# Patient Record
Sex: FEMALE | Race: WHITE | NOT HISPANIC OR LATINO | Employment: OTHER | URBAN - NONMETROPOLITAN AREA
[De-identification: names, ages, dates, MRNs, and addresses within clinical notes are randomized per-mention and may not be internally consistent; named-entity substitution may affect disease eponyms.]

---

## 2023-05-11 PROBLEM — E87.6 HYPOKALEMIA: Status: ACTIVE | Noted: 2023-05-11

## 2023-05-16 DIAGNOSIS — E78.00 HYPERCHOLESTEROLEMIA: ICD-10-CM

## 2023-05-16 DIAGNOSIS — I10 BENIGN HYPERTENSION: ICD-10-CM

## 2023-05-17 RX ORDER — HYDROGEN PEROXIDE 3 %
1 SOLUTION, NON-ORAL MISCELLANEOUS DAILY
COMMUNITY
Start: 2015-12-09 | End: 2023-12-19 | Stop reason: WASHOUT

## 2023-05-17 RX ORDER — VERAPAMIL HYDROCHLORIDE 240 MG/1
TABLET, FILM COATED, EXTENDED RELEASE ORAL
Qty: 90 TABLET | Refills: 0 | Status: SHIPPED | OUTPATIENT
Start: 2023-05-17 | End: 2023-06-15 | Stop reason: SDUPTHER

## 2023-05-17 RX ORDER — OLMESARTAN MEDOXOMIL AND HYDROCHLOROTHIAZIDE 40/25 40; 25 MG/1; MG/1
1 TABLET ORAL DAILY
COMMUNITY
Start: 2021-04-22 | End: 2023-12-19 | Stop reason: WASHOUT

## 2023-05-17 RX ORDER — OLMESARTAN MEDOXOMIL AND HYDROCHLOROTHIAZIDE 40/25 40; 25 MG/1; MG/1
TABLET ORAL
Qty: 90 TABLET | Refills: 0 | Status: SHIPPED | OUTPATIENT
Start: 2023-05-17 | End: 2023-06-15 | Stop reason: SDUPTHER

## 2023-05-17 RX ORDER — VERAPAMIL HYDROCHLORIDE 240 MG/1
1 TABLET, FILM COATED, EXTENDED RELEASE ORAL DAILY
COMMUNITY
Start: 2013-08-28 | End: 2023-11-15 | Stop reason: SDUPTHER

## 2023-05-17 RX ORDER — HYDROGEN PEROXIDE 3 %
SOLUTION, NON-ORAL MISCELLANEOUS
Qty: 90 CAPSULE | Refills: 0 | Status: SHIPPED | OUTPATIENT
Start: 2023-05-17 | End: 2023-06-15 | Stop reason: SDUPTHER

## 2023-05-17 RX ORDER — SIMVASTATIN 20 MG/1
TABLET, FILM COATED ORAL
Qty: 90 TABLET | Refills: 0 | Status: SHIPPED | OUTPATIENT
Start: 2023-05-17 | End: 2023-06-15 | Stop reason: SDUPTHER

## 2023-05-17 RX ORDER — SIMVASTATIN 20 MG/1
1 TABLET, FILM COATED ORAL DAILY
COMMUNITY
Start: 2013-05-27 | End: 2023-11-15 | Stop reason: SDUPTHER

## 2023-06-07 PROBLEM — E78.5 HYPERLIPIDEMIA: Status: ACTIVE | Noted: 2023-06-07

## 2023-06-07 PROBLEM — J45.909 ASTHMATIC BRONCHITIS (HHS-HCC): Status: ACTIVE | Noted: 2023-06-07

## 2023-06-07 PROBLEM — I10 BENIGN ESSENTIAL HYPERTENSION: Status: ACTIVE | Noted: 2023-06-07

## 2023-06-07 PROBLEM — K58.9 IBS (IRRITABLE BOWEL SYNDROME): Status: ACTIVE | Noted: 2023-06-07

## 2023-06-07 PROBLEM — K21.9 ESOPHAGEAL REFLUX: Status: ACTIVE | Noted: 2023-06-07

## 2023-06-07 PROBLEM — K76.0 NONALCOHOLIC HEPATOSTEATOSIS: Status: ACTIVE | Noted: 2023-06-07

## 2023-06-07 PROBLEM — E66.9 NON MORBID OBESITY, UNSPECIFIED OBESITY TYPE: Status: ACTIVE | Noted: 2023-06-07

## 2023-06-07 PROBLEM — F32.5 DEPRESSION, MAJOR, IN REMISSION (CMS-HCC): Status: ACTIVE | Noted: 2023-06-07

## 2023-06-07 PROBLEM — I10 ACCELERATED HYPERTENSION: Status: ACTIVE | Noted: 2023-06-07

## 2023-06-07 PROBLEM — K64.3 GRADE IV HEMORRHOIDS: Status: ACTIVE | Noted: 2023-06-07

## 2023-06-07 PROBLEM — J30.9 ALLERGIC RHINITIS: Status: ACTIVE | Noted: 2023-06-07

## 2023-06-07 PROBLEM — D32.9 MENINGIOMA (MULTI): Status: ACTIVE | Noted: 2023-06-07

## 2023-06-07 RX ORDER — ALBUTEROL SULFATE 90 UG/1
2 AEROSOL, METERED RESPIRATORY (INHALATION) EVERY 6 HOURS PRN
COMMUNITY
Start: 2017-01-24 | End: 2023-06-15 | Stop reason: SDUPTHER

## 2023-06-07 RX ORDER — CHOLECALCIFEROL (VITAMIN D3) 25 MCG
25 TABLET ORAL DAILY
COMMUNITY
Start: 2022-05-17

## 2023-06-07 RX ORDER — GLUCOSAMINE/CHONDRO SU A 500-400 MG
1 TABLET ORAL 2 TIMES DAILY
COMMUNITY

## 2023-06-07 RX ORDER — ASCORBIC ACID 500 MG
1 TABLET ORAL DAILY
COMMUNITY

## 2023-06-15 ENCOUNTER — OFFICE VISIT (OUTPATIENT)
Dept: PRIMARY CARE | Facility: CLINIC | Age: 68
End: 2023-06-15
Payer: COMMERCIAL

## 2023-06-15 VITALS
WEIGHT: 198.6 LBS | HEIGHT: 61 IN | HEART RATE: 97 BPM | BODY MASS INDEX: 37.49 KG/M2 | TEMPERATURE: 98.6 F | DIASTOLIC BLOOD PRESSURE: 80 MMHG | SYSTOLIC BLOOD PRESSURE: 126 MMHG | OXYGEN SATURATION: 97 %

## 2023-06-15 DIAGNOSIS — E78.00 HYPERCHOLESTEROLEMIA: ICD-10-CM

## 2023-06-15 DIAGNOSIS — Z78.0 MENOPAUSE: ICD-10-CM

## 2023-06-15 DIAGNOSIS — Z00.00 ANNUAL PHYSICAL EXAM: Primary | ICD-10-CM

## 2023-06-15 DIAGNOSIS — I10 BENIGN HYPERTENSION: ICD-10-CM

## 2023-06-15 DIAGNOSIS — K21.9 GASTROESOPHAGEAL REFLUX DISEASE WITHOUT ESOPHAGITIS: ICD-10-CM

## 2023-06-15 DIAGNOSIS — D32.9 MENINGIOMA (MULTI): ICD-10-CM

## 2023-06-15 DIAGNOSIS — E78.5 HYPERLIPIDEMIA, UNSPECIFIED HYPERLIPIDEMIA TYPE: ICD-10-CM

## 2023-06-15 DIAGNOSIS — E87.6 HYPOKALEMIA: ICD-10-CM

## 2023-06-15 DIAGNOSIS — K76.0 NONALCOHOLIC HEPATOSTEATOSIS: ICD-10-CM

## 2023-06-15 DIAGNOSIS — J45.20 MILD INTERMITTENT ASTHMATIC BRONCHITIS WITHOUT COMPLICATION (HHS-HCC): ICD-10-CM

## 2023-06-15 DIAGNOSIS — I10 BENIGN ESSENTIAL HYPERTENSION: ICD-10-CM

## 2023-06-15 PROCEDURE — 99214 OFFICE O/P EST MOD 30 MIN: CPT | Performed by: INTERNAL MEDICINE

## 2023-06-15 PROCEDURE — 3074F SYST BP LT 130 MM HG: CPT | Performed by: INTERNAL MEDICINE

## 2023-06-15 PROCEDURE — 1160F RVW MEDS BY RX/DR IN RCRD: CPT | Performed by: INTERNAL MEDICINE

## 2023-06-15 PROCEDURE — 1036F TOBACCO NON-USER: CPT | Performed by: INTERNAL MEDICINE

## 2023-06-15 PROCEDURE — 1159F MED LIST DOCD IN RCRD: CPT | Performed by: INTERNAL MEDICINE

## 2023-06-15 PROCEDURE — 3079F DIAST BP 80-89 MM HG: CPT | Performed by: INTERNAL MEDICINE

## 2023-06-15 PROCEDURE — 99397 PER PM REEVAL EST PAT 65+ YR: CPT | Performed by: INTERNAL MEDICINE

## 2023-06-15 RX ORDER — VERAPAMIL HYDROCHLORIDE 240 MG/1
240 TABLET, FILM COATED, EXTENDED RELEASE ORAL DAILY
Qty: 90 TABLET | Refills: 0 | Status: SHIPPED | OUTPATIENT
Start: 2023-06-15 | End: 2023-08-07 | Stop reason: SDUPTHER

## 2023-06-15 RX ORDER — HYDROGEN PEROXIDE 3 %
20 SOLUTION, NON-ORAL MISCELLANEOUS DAILY
Qty: 90 CAPSULE | Refills: 0 | Status: SHIPPED | OUTPATIENT
Start: 2023-06-15 | End: 2023-11-06 | Stop reason: SDUPTHER

## 2023-06-15 RX ORDER — SIMVASTATIN 20 MG/1
20 TABLET, FILM COATED ORAL DAILY
Qty: 90 TABLET | Refills: 0 | Status: SHIPPED | OUTPATIENT
Start: 2023-06-15 | End: 2023-11-16 | Stop reason: SINTOL

## 2023-06-15 RX ORDER — OLMESARTAN MEDOXOMIL AND HYDROCHLOROTHIAZIDE 40/25 40; 25 MG/1; MG/1
1 TABLET ORAL DAILY
Qty: 90 TABLET | Refills: 0 | Status: SHIPPED | OUTPATIENT
Start: 2023-06-15 | End: 2023-11-06 | Stop reason: SDUPTHER

## 2023-06-15 RX ORDER — ALBUTEROL SULFATE 90 UG/1
2 AEROSOL, METERED RESPIRATORY (INHALATION) EVERY 6 HOURS PRN
Qty: 18 G | Refills: 0 | Status: SHIPPED | OUTPATIENT
Start: 2023-06-15 | End: 2023-06-19

## 2023-06-15 ASSESSMENT — PATIENT HEALTH QUESTIONNAIRE - PHQ9
SUM OF ALL RESPONSES TO PHQ9 QUESTIONS 1 AND 2: 1
1. LITTLE INTEREST OR PLEASURE IN DOING THINGS: NOT AT ALL
2. FEELING DOWN, DEPRESSED OR HOPELESS: SEVERAL DAYS
10. IF YOU CHECKED OFF ANY PROBLEMS, HOW DIFFICULT HAVE THESE PROBLEMS MADE IT FOR YOU TO DO YOUR WORK, TAKE CARE OF THINGS AT HOME, OR GET ALONG WITH OTHER PEOPLE: NOT DIFFICULT AT ALL

## 2023-06-15 ASSESSMENT — ENCOUNTER SYMPTOMS
DEPRESSION: 1
LOSS OF SENSATION IN FEET: 0
FEVER: 0
DIFFICULTY URINATING: 0
OCCASIONAL FEELINGS OF UNSTEADINESS: 0
BLOOD IN STOOL: 0
HEADACHES: 0
ABDOMINAL PAIN: 0
PALPITATIONS: 0
BRUISES/BLEEDS EASILY: 0
UNEXPECTED WEIGHT CHANGE: 0
COUGH: 0
DIARRHEA: 0
ARTHRALGIAS: 0
SINUS PAIN: 0
SORE THROAT: 0
DIZZINESS: 0
WHEEZING: 0
FATIGUE: 0

## 2023-06-15 ASSESSMENT — ANXIETY QUESTIONNAIRES
2. NOT BEING ABLE TO STOP OR CONTROL WORRYING: NOT AT ALL
4. TROUBLE RELAXING: NOT AT ALL
3. WORRYING TOO MUCH ABOUT DIFFERENT THINGS: NOT AT ALL
6. BECOMING EASILY ANNOYED OR IRRITABLE: NOT AT ALL
GAD7 TOTAL SCORE: 0
IF YOU CHECKED OFF ANY PROBLEMS ON THIS QUESTIONNAIRE, HOW DIFFICULT HAVE THESE PROBLEMS MADE IT FOR YOU TO DO YOUR WORK, TAKE CARE OF THINGS AT HOME, OR GET ALONG WITH OTHER PEOPLE: NOT DIFFICULT AT ALL
5. BEING SO RESTLESS THAT IT IS HARD TO SIT STILL: NOT AT ALL
1. FEELING NERVOUS, ANXIOUS, OR ON EDGE: NOT AT ALL
7. FEELING AFRAID AS IF SOMETHING AWFUL MIGHT HAPPEN: NOT AT ALL

## 2023-06-15 NOTE — PROGRESS NOTES
Subjective   Patient ID: Brinda Ruiz is a 67 y.o. female who presents for Annual Exam.    Annual preventive visit  -Vaccinations up-to-date  - Screening for colon cancer obtained in  repeat in   -Screening mammogram obtained 2020 3 repeat next year  - Needs to complete blood work  - Depression screening negative not taking any medication  - Obesity class II counseled about weight loss  - Cardiovascular risk screening calcium score 0  - Needs a screening for bone density    Follow-up  - History of hepatic steatosis counseled about weight loss counseled about BMI repeat liver ultrasound previous hepatitis C screening negative  Patient brother  from hepatic cirrhosis and possible cancer  -History of hemorrhoids and hemorrhoidectomy doing well no recurrence  -Irritable bowel syndrome only on diet controlled asymptomatic not taking any medication at this time  -C-Patient underwent gamma knife treatment for meningioma patient doing much better no recurrence  -Major depression now in remission  -Meningioma status post gamma knife treatment stable no change asymptomatic continue monitor conservatively previous multiple MRIs showed no change and shrinking in size  -Reflux disease controlled patient did not tolerate a smaller dose patient on 40 milligrams daily counseled about magnesium and potassium replacements  -Hypercholesterolemia on low fat diet continue simvastatin 40 mg.  Follow-up 6 months               Review of Systems   Constitutional:  Negative for fatigue, fever and unexpected weight change.   HENT:  Negative for congestion, ear discharge, ear pain, mouth sores, sinus pain and sore throat.    Eyes:  Negative for visual disturbance.   Respiratory:  Negative for cough and wheezing.    Cardiovascular:  Negative for chest pain, palpitations and leg swelling.   Gastrointestinal:  Negative for abdominal pain, blood in stool and diarrhea.   Genitourinary:  Negative for difficulty urinating.  "  Musculoskeletal:  Negative for arthralgias.   Skin:  Negative for rash.   Neurological:  Negative for dizziness and headaches.   Hematological:  Does not bruise/bleed easily.   Psychiatric/Behavioral:  Negative for behavioral problems.    All other systems reviewed and are negative.      Objective   No results found for: \"HGBA1C\"   /80   Pulse 97   Temp 37 °C (98.6 °F)   Ht 1.549 m (5' 1\")   Wt 90.1 kg (198 lb 9.6 oz)   SpO2 97%   BMI 37.53 kg/m²   Lab Results   Component Value Date    WBC 4.3 (L) 07/16/2022    HGB 12.9 07/16/2022    HCT 39.4 07/16/2022     07/16/2022    CHOL 151 07/16/2022    TRIG 111 07/16/2022    HDL 50.0 07/16/2022    ALT 24 07/16/2022    AST 16 07/16/2022     07/16/2022    K 4.1 07/16/2022     07/16/2022    CREATININE 0.72 07/16/2022    BUN 16 07/16/2022    CO2 30 07/16/2022    TSH 1.66 07/16/2022     par   Physical Exam  Vitals and nursing note reviewed.   Constitutional:       Appearance: Normal appearance.   HENT:      Head: Normocephalic.      Nose: Nose normal.   Eyes:      Conjunctiva/sclera: Conjunctivae normal.      Pupils: Pupils are equal, round, and reactive to light.   Cardiovascular:      Rate and Rhythm: Regular rhythm.   Pulmonary:      Effort: Pulmonary effort is normal.      Breath sounds: Normal breath sounds.   Abdominal:      General: Abdomen is flat.      Palpations: Abdomen is soft.   Musculoskeletal:      Cervical back: Neck supple.   Skin:     General: Skin is warm.   Neurological:      General: No focal deficit present.      Mental Status: She is oriented to person, place, and time.   Psychiatric:         Mood and Affect: Mood normal.         Assessment/Plan   Brinda was seen today for annual exam.  Diagnoses and all orders for this visit:  Annual physical exam (Primary)  -     CBC and Auto Differential; Future  -     Comprehensive Metabolic Panel; Future  -     Lipid Panel; Future  -     TSH with reflex to Free T4 if abnormal; " Future  Benign hypertension  -     esomeprazole (NexIUM) 20 mg DR capsule; Take 1 capsule (20 mg) by mouth once daily. Do not open capsule.  -     olmesartan-hydrochlorothiazide (BENIcar HCT) 40-25 mg tablet; Take 1 tablet by mouth once daily.  -     verapamil SR (Calan-SR) 240 mg ER tablet; Take 1 tablet (240 mg) by mouth once daily. Do not crush or chew.  Hypercholesterolemia  -     simvastatin (Zocor) 20 mg tablet; Take 1 tablet (20 mg) by mouth once daily.  -     Hemoglobin A1C; Future  Meningioma (CMS/HCC)  Menopause  -     XR DEXA bone density; Future  Hyperlipidemia, unspecified hyperlipidemia type  Nonalcoholic hepatosteatosis  -     US abdomen limited liver; Future  -     Hemoglobin A1C; Future  Benign essential hypertension  -     Hemoglobin A1C; Future  Gastroesophageal reflux disease without esophagitis  Hypokalemia  Mild intermittent asthmatic bronchitis without complication  -     albuterol 90 mcg/actuation inhaler; Inhale 2 puffs every 6 hours if needed for wheezing.   Annual preventive visit  -Vaccinations up-to-date  - Screening for colon cancer obtained in  repeat in   -Screening mammogram obtained 2020 3 repeat next year  - Needs to complete blood work  - Depression screening negative not taking any medication  - Obesity class II counseled about weight loss  - Cardiovascular risk screening calcium score 0  - Needs a screening for bone density    Follow-up  - History of hepatic steatosis counseled about weight loss counseled about BMI repeat liver ultrasound previous hepatitis C screening negative  Patient brother  from hepatic cirrhosis and possible cancer  -History of hemorrhoids and hemorrhoidectomy doing well no recurrence  -Irritable bowel syndrome only on diet controlled asymptomatic not taking any medication at this time  -C-Patient underwent gamma knife treatment for meningioma patient doing much better no recurrence  -Major depression now in remission  -Meningioma status  post gamma knife treatment stable no change asymptomatic continue monitor conservatively previous multiple MRIs showed no change and shrinking in size  -Reflux disease controlled patient did not tolerate a smaller dose patient on 40 milligrams daily counseled about magnesium and potassium replacements  -Hypercholesterolemia on low fat diet continue simvastatin 40 mg.  Follow-up 6 months

## 2023-06-16 DIAGNOSIS — J45.20 MILD INTERMITTENT ASTHMATIC BRONCHITIS WITHOUT COMPLICATION (HHS-HCC): ICD-10-CM

## 2023-06-17 ENCOUNTER — LAB (OUTPATIENT)
Dept: LAB | Facility: LAB | Age: 68
End: 2023-06-17
Payer: COMMERCIAL

## 2023-06-17 DIAGNOSIS — K76.0 NONALCOHOLIC HEPATOSTEATOSIS: ICD-10-CM

## 2023-06-17 DIAGNOSIS — E78.00 HYPERCHOLESTEROLEMIA: ICD-10-CM

## 2023-06-17 DIAGNOSIS — I10 BENIGN ESSENTIAL HYPERTENSION: ICD-10-CM

## 2023-06-17 DIAGNOSIS — Z00.00 ANNUAL PHYSICAL EXAM: ICD-10-CM

## 2023-06-17 LAB
ALANINE AMINOTRANSFERASE (SGPT) (U/L) IN SER/PLAS: 29 U/L (ref 7–45)
ALBUMIN (G/DL) IN SER/PLAS: 4.1 G/DL (ref 3.4–5)
ALKALINE PHOSPHATASE (U/L) IN SER/PLAS: 77 U/L (ref 33–136)
ANION GAP IN SER/PLAS: 11 MMOL/L (ref 10–20)
ASPARTATE AMINOTRANSFERASE (SGOT) (U/L) IN SER/PLAS: 19 U/L (ref 9–39)
BASOPHILS (10*3/UL) IN BLOOD BY AUTOMATED COUNT: 0.05 X10E9/L (ref 0–0.1)
BASOPHILS/100 LEUKOCYTES IN BLOOD BY AUTOMATED COUNT: 1.2 % (ref 0–2)
BILIRUBIN TOTAL (MG/DL) IN SER/PLAS: 0.8 MG/DL (ref 0–1.2)
CALCIUM (MG/DL) IN SER/PLAS: 9.4 MG/DL (ref 8.6–10.3)
CARBON DIOXIDE, TOTAL (MMOL/L) IN SER/PLAS: 31 MMOL/L (ref 21–32)
CHLORIDE (MMOL/L) IN SER/PLAS: 105 MMOL/L (ref 98–107)
CHOLESTEROL (MG/DL) IN SER/PLAS: 174 MG/DL (ref 0–199)
CHOLESTEROL IN HDL (MG/DL) IN SER/PLAS: 49.9 MG/DL
CHOLESTEROL/HDL RATIO: 3.5
CREATININE (MG/DL) IN SER/PLAS: 0.67 MG/DL (ref 0.5–1.05)
EOSINOPHILS (10*3/UL) IN BLOOD BY AUTOMATED COUNT: 0.18 X10E9/L (ref 0–0.7)
EOSINOPHILS/100 LEUKOCYTES IN BLOOD BY AUTOMATED COUNT: 4.2 % (ref 0–6)
ERYTHROCYTE DISTRIBUTION WIDTH (RATIO) BY AUTOMATED COUNT: 12.5 % (ref 11.5–14.5)
ERYTHROCYTE MEAN CORPUSCULAR HEMOGLOBIN CONCENTRATION (G/DL) BY AUTOMATED: 33.3 G/DL (ref 32–36)
ERYTHROCYTE MEAN CORPUSCULAR VOLUME (FL) BY AUTOMATED COUNT: 94 FL (ref 80–100)
ERYTHROCYTES (10*6/UL) IN BLOOD BY AUTOMATED COUNT: 4.41 X10E12/L (ref 4–5.2)
GFR FEMALE: >90 ML/MIN/1.73M2
GLUCOSE (MG/DL) IN SER/PLAS: 107 MG/DL (ref 74–99)
HEMATOCRIT (%) IN BLOOD BY AUTOMATED COUNT: 41.5 % (ref 36–46)
HEMOGLOBIN (G/DL) IN BLOOD: 13.8 G/DL (ref 12–16)
IMMATURE GRANULOCYTES/100 LEUKOCYTES IN BLOOD BY AUTOMATED COUNT: 0.2 % (ref 0–0.9)
LDL: 103 MG/DL (ref 0–99)
LEUKOCYTES (10*3/UL) IN BLOOD BY AUTOMATED COUNT: 4.3 X10E9/L (ref 4.4–11.3)
LYMPHOCYTES (10*3/UL) IN BLOOD BY AUTOMATED COUNT: 1.51 X10E9/L (ref 1.2–4.8)
LYMPHOCYTES/100 LEUKOCYTES IN BLOOD BY AUTOMATED COUNT: 35.3 % (ref 13–44)
MONOCYTES (10*3/UL) IN BLOOD BY AUTOMATED COUNT: 0.36 X10E9/L (ref 0.1–1)
MONOCYTES/100 LEUKOCYTES IN BLOOD BY AUTOMATED COUNT: 8.4 % (ref 2–10)
NEUTROPHILS (10*3/UL) IN BLOOD BY AUTOMATED COUNT: 2.17 X10E9/L (ref 1.2–7.7)
NEUTROPHILS/100 LEUKOCYTES IN BLOOD BY AUTOMATED COUNT: 50.7 % (ref 40–80)
PLATELETS (10*3/UL) IN BLOOD AUTOMATED COUNT: 214 X10E9/L (ref 150–450)
POTASSIUM (MMOL/L) IN SER/PLAS: 4.1 MMOL/L (ref 3.5–5.3)
PROTEIN TOTAL: 6.3 G/DL (ref 6.4–8.2)
SODIUM (MMOL/L) IN SER/PLAS: 143 MMOL/L (ref 136–145)
THYROTROPIN (MIU/L) IN SER/PLAS BY DETECTION LIMIT <= 0.05 MIU/L: 1.17 MIU/L (ref 0.44–3.98)
TRIGLYCERIDE (MG/DL) IN SER/PLAS: 106 MG/DL (ref 0–149)
UREA NITROGEN (MG/DL) IN SER/PLAS: 13 MG/DL (ref 6–23)
VLDL: 21 MG/DL (ref 0–40)

## 2023-06-17 PROCEDURE — 84443 ASSAY THYROID STIM HORMONE: CPT

## 2023-06-17 PROCEDURE — 83036 HEMOGLOBIN GLYCOSYLATED A1C: CPT

## 2023-06-17 PROCEDURE — 80053 COMPREHEN METABOLIC PANEL: CPT

## 2023-06-17 PROCEDURE — 85025 COMPLETE CBC W/AUTO DIFF WBC: CPT

## 2023-06-17 PROCEDURE — 80061 LIPID PANEL: CPT

## 2023-06-17 PROCEDURE — 36415 COLL VENOUS BLD VENIPUNCTURE: CPT

## 2023-06-19 LAB
ESTIMATED AVERAGE GLUCOSE FOR HBA1C: 97 MG/DL
HEMOGLOBIN A1C/HEMOGLOBIN TOTAL IN BLOOD: 5 %

## 2023-06-19 RX ORDER — ALBUTEROL SULFATE 90 UG/1
AEROSOL, METERED RESPIRATORY (INHALATION)
Qty: 24 G | Refills: 1 | Status: SHIPPED | OUTPATIENT
Start: 2023-06-19 | End: 2024-04-01

## 2023-08-02 DIAGNOSIS — E87.6 HYPOKALEMIA: ICD-10-CM

## 2023-08-02 RX ORDER — POTASSIUM CHLORIDE 750 MG/1
10 TABLET, FILM COATED, EXTENDED RELEASE ORAL DAILY
Qty: 90 TABLET | Refills: 0 | Status: SHIPPED | OUTPATIENT
Start: 2023-08-02 | End: 2023-11-06 | Stop reason: SDUPTHER

## 2023-08-07 DIAGNOSIS — I10 BENIGN HYPERTENSION: ICD-10-CM

## 2023-08-07 RX ORDER — VERAPAMIL HYDROCHLORIDE 240 MG/1
240 TABLET, FILM COATED, EXTENDED RELEASE ORAL DAILY
Qty: 90 TABLET | Refills: 0 | Status: SHIPPED | OUTPATIENT
Start: 2023-08-07

## 2023-11-06 DIAGNOSIS — I10 BENIGN HYPERTENSION: ICD-10-CM

## 2023-11-06 DIAGNOSIS — E87.6 HYPOKALEMIA: ICD-10-CM

## 2023-11-06 RX ORDER — HYDROGEN PEROXIDE 3 %
20 SOLUTION, NON-ORAL MISCELLANEOUS DAILY
Qty: 90 CAPSULE | Refills: 0 | Status: SHIPPED | OUTPATIENT
Start: 2023-11-06 | End: 2024-02-01 | Stop reason: SDUPTHER

## 2023-11-06 RX ORDER — POTASSIUM CHLORIDE 750 MG/1
10 TABLET, FILM COATED, EXTENDED RELEASE ORAL DAILY
Qty: 90 TABLET | Refills: 0 | Status: SHIPPED | OUTPATIENT
Start: 2023-11-06 | End: 2024-02-21 | Stop reason: SDUPTHER

## 2023-11-06 RX ORDER — OLMESARTAN MEDOXOMIL AND HYDROCHLOROTHIAZIDE 40/25 40; 25 MG/1; MG/1
1 TABLET ORAL DAILY
Qty: 90 TABLET | Refills: 0 | Status: SHIPPED | OUTPATIENT
Start: 2023-11-06

## 2023-11-15 DIAGNOSIS — E78.00 HYPERCHOLESTEROLEMIA: ICD-10-CM

## 2023-11-15 DIAGNOSIS — I10 BENIGN ESSENTIAL HYPERTENSION: ICD-10-CM

## 2023-11-15 RX ORDER — VERAPAMIL HYDROCHLORIDE 240 MG/1
240 TABLET, FILM COATED, EXTENDED RELEASE ORAL DAILY
Qty: 90 TABLET | Refills: 0 | Status: SHIPPED | OUTPATIENT
Start: 2023-11-15

## 2023-11-15 RX ORDER — SIMVASTATIN 20 MG/1
20 TABLET, FILM COATED ORAL DAILY
Qty: 90 TABLET | Refills: 0 | Status: SHIPPED | OUTPATIENT
Start: 2023-11-15 | End: 2023-11-16 | Stop reason: SINTOL

## 2023-11-16 RX ORDER — SIMVASTATIN 10 MG/1
10 TABLET, FILM COATED ORAL NIGHTLY
COMMUNITY
End: 2024-01-29 | Stop reason: SDUPTHER

## 2023-12-13 ENCOUNTER — APPOINTMENT (OUTPATIENT)
Dept: PRIMARY CARE | Facility: CLINIC | Age: 68
End: 2023-12-13
Payer: MEDICARE

## 2023-12-19 ENCOUNTER — OFFICE VISIT (OUTPATIENT)
Dept: PRIMARY CARE | Facility: CLINIC | Age: 68
End: 2023-12-19
Payer: MEDICARE

## 2023-12-19 VITALS
WEIGHT: 198.6 LBS | HEIGHT: 61 IN | HEART RATE: 78 BPM | DIASTOLIC BLOOD PRESSURE: 80 MMHG | SYSTOLIC BLOOD PRESSURE: 120 MMHG | TEMPERATURE: 97.4 F | BODY MASS INDEX: 37.49 KG/M2 | OXYGEN SATURATION: 96 %

## 2023-12-19 DIAGNOSIS — E78.5 HYPERLIPIDEMIA, UNSPECIFIED HYPERLIPIDEMIA TYPE: ICD-10-CM

## 2023-12-19 DIAGNOSIS — D32.9 MENINGIOMA (MULTI): ICD-10-CM

## 2023-12-19 DIAGNOSIS — K76.0 NONALCOHOLIC HEPATOSTEATOSIS: ICD-10-CM

## 2023-12-19 DIAGNOSIS — Z13.6 SCREENING FOR CARDIOVASCULAR CONDITION: ICD-10-CM

## 2023-12-19 DIAGNOSIS — E66.01 OBESITY, MORBID (MULTI): ICD-10-CM

## 2023-12-19 DIAGNOSIS — Z00.00 ROUTINE GENERAL MEDICAL EXAMINATION AT HEALTH CARE FACILITY: ICD-10-CM

## 2023-12-19 DIAGNOSIS — F32.5 DEPRESSION, MAJOR, IN REMISSION (CMS-HCC): ICD-10-CM

## 2023-12-19 DIAGNOSIS — E78.00 HYPERCHOLESTEROLEMIA: ICD-10-CM

## 2023-12-19 DIAGNOSIS — Z12.31 ENCOUNTER FOR SCREENING MAMMOGRAM FOR BREAST CANCER: ICD-10-CM

## 2023-12-19 DIAGNOSIS — Z00.00 ANNUAL PHYSICAL EXAM: ICD-10-CM

## 2023-12-19 DIAGNOSIS — I10 BENIGN HYPERTENSION: Primary | ICD-10-CM

## 2023-12-19 DIAGNOSIS — J45.20 MILD INTERMITTENT ASTHMATIC BRONCHITIS WITHOUT COMPLICATION (HHS-HCC): ICD-10-CM

## 2023-12-19 PROCEDURE — 1036F TOBACCO NON-USER: CPT | Performed by: INTERNAL MEDICINE

## 2023-12-19 PROCEDURE — 99214 OFFICE O/P EST MOD 30 MIN: CPT | Performed by: INTERNAL MEDICINE

## 2023-12-19 PROCEDURE — 1160F RVW MEDS BY RX/DR IN RCRD: CPT | Performed by: INTERNAL MEDICINE

## 2023-12-19 PROCEDURE — 3079F DIAST BP 80-89 MM HG: CPT | Performed by: INTERNAL MEDICINE

## 2023-12-19 PROCEDURE — 1159F MED LIST DOCD IN RCRD: CPT | Performed by: INTERNAL MEDICINE

## 2023-12-19 PROCEDURE — 3074F SYST BP LT 130 MM HG: CPT | Performed by: INTERNAL MEDICINE

## 2023-12-19 PROCEDURE — G0403 EKG FOR INITIAL PREVENT EXAM: HCPCS | Performed by: INTERNAL MEDICINE

## 2023-12-19 PROCEDURE — G0402 INITIAL PREVENTIVE EXAM: HCPCS | Performed by: INTERNAL MEDICINE

## 2023-12-19 PROCEDURE — 1170F FXNL STATUS ASSESSED: CPT | Performed by: INTERNAL MEDICINE

## 2023-12-19 RX ORDER — PNEUMOCOCCAL 20-VALENT CONJUGATE VACCINE 2.2; 2.2; 2.2; 2.2; 2.2; 2.2; 2.2; 2.2; 2.2; 2.2; 2.2; 2.2; 2.2; 2.2; 2.2; 2.2; 4.4; 2.2; 2.2; 2.2 UG/.5ML; UG/.5ML; UG/.5ML; UG/.5ML; UG/.5ML; UG/.5ML; UG/.5ML; UG/.5ML; UG/.5ML; UG/.5ML; UG/.5ML; UG/.5ML; UG/.5ML; UG/.5ML; UG/.5ML; UG/.5ML; UG/.5ML; UG/.5ML; UG/.5ML; UG/.5ML
0.5 INJECTION, SUSPENSION INTRAMUSCULAR ONCE
Qty: 0.5 ML | Refills: 0 | Status: SHIPPED | OUTPATIENT
Start: 2023-12-19 | End: 2023-12-19

## 2023-12-19 RX ORDER — BISMUTH SUBSALICYLATE 262 MG
1 TABLET,CHEWABLE ORAL DAILY
COMMUNITY

## 2023-12-19 ASSESSMENT — ACTIVITIES OF DAILY LIVING (ADL)
BATHING: INDEPENDENT
DRESSING: INDEPENDENT
GROCERY_SHOPPING: INDEPENDENT
MANAGING_FINANCES: INDEPENDENT
DOING_HOUSEWORK: INDEPENDENT
TAKING_MEDICATION: INDEPENDENT

## 2023-12-19 ASSESSMENT — ENCOUNTER SYMPTOMS
WHEEZING: 0
OCCASIONAL FEELINGS OF UNSTEADINESS: 1
BLOOD IN STOOL: 0
SINUS PAIN: 0
COUGH: 0
PALPITATIONS: 0
ABDOMINAL PAIN: 0
DEPRESSION: 1
LOSS OF SENSATION IN FEET: 0
FATIGUE: 0
DIARRHEA: 0
DIFFICULTY URINATING: 0
BRUISES/BLEEDS EASILY: 0
FEVER: 0
HEADACHES: 0
UNEXPECTED WEIGHT CHANGE: 1
ARTHRALGIAS: 0
DIZZINESS: 0
SORE THROAT: 0

## 2023-12-19 ASSESSMENT — PATIENT HEALTH QUESTIONNAIRE - PHQ9
2. FEELING DOWN, DEPRESSED OR HOPELESS: SEVERAL DAYS
SUM OF ALL RESPONSES TO PHQ9 QUESTIONS 1 AND 2: 1
10. IF YOU CHECKED OFF ANY PROBLEMS, HOW DIFFICULT HAVE THESE PROBLEMS MADE IT FOR YOU TO DO YOUR WORK, TAKE CARE OF THINGS AT HOME, OR GET ALONG WITH OTHER PEOPLE: NOT DIFFICULT AT ALL
1. LITTLE INTEREST OR PLEASURE IN DOING THINGS: NOT AT ALL

## 2023-12-19 NOTE — PROGRESS NOTES
"Subjective   Patient ID: Brinda Ruiz is a 68 y.o. female who presents for Welcome To Medicare.    HPI       Review of Systems    Objective   Lab Results   Component Value Date    HGBA1C 5.0 06/17/2023      /88   Pulse 78   Temp 36.3 °C (97.4 °F)   Ht 1.549 m (5' 1\")   Wt 90.1 kg (198 lb 9.6 oz)   SpO2 96%   BMI 37.53 kg/m²     Physical Exam    Assessment/Plan   Brinda was seen today for welcome to medicare.  Diagnoses and all orders for this visit:  Mild intermittent asthmatic bronchitis without complication     "

## 2023-12-19 NOTE — PROGRESS NOTES
Subjective   Reason for Visit: Brinda Ruiz is an 68 y.o. female here for a Medicare Wellness visit.     Past Medical, Surgical, and Family History reviewed and updated in chart.    Reviewed all medications by prescribing practitioner or clinical pharmacist (such as prescriptions, OTCs, herbal therapies and supplements) and documented in the medical record.    Welcome to Medicare  Discussed with patient last blood work  -Vaccination need to have Prevnar 20 shingles vaccine up-to-date flu vaccine up-to-date COVID and flu also up-to-date  -Screening colonoscopy obtained in  need to repeat in 10 years   -Screening for depression mild reactive depression patient recently lost her dog slowly improving  - Needs referral to GYN for pelvic exam  - Screening for coronary artery disease EKG sinus rhythm patient reassured  - Cardiac calcium score score 0 in 2020 [facility]  - Counseled about obesity and weight loss counseled about BMI    Discussed with patient Medicare screenings    Follow-up  - Hypertension controlled continue with current recommended medication  - History of hepatic steatosis counseled about weight loss counseled about BMI repeat liver ultrasound previous hepatitis C screening negative  Patient brother  from hepatic cirrhosis and possible cancer  Counseled about monitoring and weight loss  -History of hemorrhoids and hemorrhoidectomy doing well no recurrence  -Irritable bowel syndrome only on diet controlled asymptomatic not taking any medication at this time continue with high-fiber diet  -Meningioma in remission, ,-Patient underwent gamma knife treatment for meningioma patient doing much better no recurrence  -Major depression now in remission  --Reflux disease controlled patient did not tolerate a smaller dose patient on 40 milligrams daily counseled about magnesium and potassium replacements  -Hypercholesterolemia on low fat diet continue simvastatin 10 mg.  Repeat  "lipid profile in 6 months  Follow-up 6 months              Patient Care Team:  Amy Benton MD as PCP - General     Review of Systems   Constitutional:  Positive for unexpected weight change. Negative for fatigue and fever.   HENT:  Negative for congestion, ear discharge, ear pain, mouth sores, sinus pain and sore throat.    Eyes:  Negative for visual disturbance.   Respiratory:  Negative for cough and wheezing.    Cardiovascular:  Negative for chest pain, palpitations and leg swelling.   Gastrointestinal:  Negative for abdominal pain, blood in stool and diarrhea.   Genitourinary:  Negative for difficulty urinating.   Musculoskeletal:  Negative for arthralgias.   Skin:  Negative for rash.   Neurological:  Negative for dizziness and headaches.   Hematological:  Does not bruise/bleed easily.   Psychiatric/Behavioral:  Negative for behavioral problems.    All other systems reviewed and are negative.      Objective   Vitals:  /80   Pulse 78   Temp 36.3 °C (97.4 °F)   Ht 1.549 m (5' 1\")   Wt 90.1 kg (198 lb 9.6 oz)   SpO2 96%   BMI 37.53 kg/m²       Physical Exam  Vitals and nursing note reviewed.   Constitutional:       Appearance: Normal appearance. She is obese.   HENT:      Head: Normocephalic.      Nose: Nose normal.   Eyes:      Conjunctiva/sclera: Conjunctivae normal.      Pupils: Pupils are equal, round, and reactive to light.   Cardiovascular:      Rate and Rhythm: Regular rhythm.      Heart sounds: No murmur heard.  Pulmonary:      Effort: Pulmonary effort is normal.      Breath sounds: Normal breath sounds.   Abdominal:      General: Abdomen is flat.      Palpations: Abdomen is soft.   Musculoskeletal:      Cervical back: Neck supple.   Skin:     General: Skin is warm.   Neurological:      General: No focal deficit present.      Mental Status: She is oriented to person, place, and time.   Psychiatric:         Mood and Affect: Mood normal.         Assessment/Plan   Problem List Items Addressed " This Visit       Asthmatic bronchitis    Depression, major, in remission (CMS/HCC)    Hypercholesterolemia    Meningioma (CMS/HCC)    Obesity, morbid (CMS/HCC)    Nonalcoholic hepatosteatosis    Annual physical exam    Relevant Medications    pneumoc 20-alma conj-dip cr,PF, (Prevnar 20, PF,) 0.5 mL vaccine    Other Relevant Orders    Referral to Gynecology    CBC and Auto Differential    Comprehensive Metabolic Panel    Lipid Panel    Hemoglobin A1C    TSH with reflex to Free T4 if abnormal     Other Visit Diagnoses       Benign hypertension    -  Primary    Relevant Orders    CBC and Auto Differential    Screening for cardiovascular condition        Relevant Orders    ECG 12 lead    Encounter for screening mammogram for breast cancer        Relevant Orders    BI mammo bilateral screening tomosynthesis    Hyperlipidemia, unspecified hyperlipidemia type        Routine general medical examination at health care facility              Welcome to Medicare  Discussed with patient last blood work  -Vaccination need to have Prevnar 20 shingles vaccine up-to-date flu vaccine up-to-date COVID and flu also up-to-date  -Screening colonoscopy obtained in  need to repeat in 10 years   -Screening for depression mild reactive depression patient recently lost her dog slowly improving  - Needs referral to GYN for pelvic exam  - Screening for coronary artery disease EKG sinus rhythm patient reassured  - Cardiac calcium score score 0 in 2020 [facility]  - Counseled about obesity and weight loss counseled about BMI    Discussed with patient Medicare screenings    Follow-up  - Hypertension controlled continue with current recommended medication  - History of hepatic steatosis counseled about weight loss counseled about BMI repeat liver ultrasound previous hepatitis C screening negative  Patient brother  from hepatic cirrhosis and possible cancer  Counseled about monitoring and weight loss  -History of  hemorrhoids and hemorrhoidectomy doing well no recurrence  -Irritable bowel syndrome only on diet controlled asymptomatic not taking any medication at this time continue with high-fiber diet  -Meningioma in remission, ,-Patient underwent gamma knife treatment for meningioma patient doing much better no recurrence  -Major depression now in remission  --Reflux disease controlled patient did not tolerate a smaller dose patient on 40 milligrams daily counseled about magnesium and potassium replacements  -Hypercholesterolemia on low fat diet continue simvastatin 10 mg.  Repeat lipid profile in 6 months  Follow-up 6 months

## 2024-01-04 ENCOUNTER — APPOINTMENT (OUTPATIENT)
Dept: RADIOLOGY | Facility: HOSPITAL | Age: 69
End: 2024-01-04
Payer: MEDICARE

## 2024-01-06 ENCOUNTER — LAB (OUTPATIENT)
Dept: LAB | Facility: LAB | Age: 69
End: 2024-01-06
Payer: MEDICARE

## 2024-01-06 DIAGNOSIS — Z00.00 ANNUAL PHYSICAL EXAM: ICD-10-CM

## 2024-01-06 DIAGNOSIS — I10 BENIGN HYPERTENSION: ICD-10-CM

## 2024-01-06 LAB
ALBUMIN SERPL BCP-MCNC: 4.1 G/DL (ref 3.4–5)
ALP SERPL-CCNC: 86 U/L (ref 33–136)
ALT SERPL W P-5'-P-CCNC: 24 U/L (ref 7–45)
ANION GAP SERPL CALC-SCNC: 13 MMOL/L (ref 10–20)
AST SERPL W P-5'-P-CCNC: 16 U/L (ref 9–39)
BASOPHILS # BLD AUTO: 0.05 X10*3/UL (ref 0–0.1)
BASOPHILS NFR BLD AUTO: 1.1 %
BILIRUB SERPL-MCNC: 0.8 MG/DL (ref 0–1.2)
BUN SERPL-MCNC: 22 MG/DL (ref 6–23)
CALCIUM SERPL-MCNC: 9.5 MG/DL (ref 8.6–10.3)
CHLORIDE SERPL-SCNC: 106 MMOL/L (ref 98–107)
CHOLEST SERPL-MCNC: 177 MG/DL (ref 0–199)
CHOLESTEROL/HDL RATIO: 3.5
CO2 SERPL-SCNC: 29 MMOL/L (ref 21–32)
CREAT SERPL-MCNC: 0.67 MG/DL (ref 0.5–1.05)
EOSINOPHIL # BLD AUTO: 0.18 X10*3/UL (ref 0–0.7)
EOSINOPHIL NFR BLD AUTO: 4.1 %
ERYTHROCYTE [DISTWIDTH] IN BLOOD BY AUTOMATED COUNT: 12.9 % (ref 11.5–14.5)
EST. AVERAGE GLUCOSE BLD GHB EST-MCNC: 97 MG/DL
GFR SERPL CREATININE-BSD FRML MDRD: >90 ML/MIN/1.73M*2
GLUCOSE SERPL-MCNC: 104 MG/DL (ref 74–99)
HBA1C MFR BLD: 5 %
HCT VFR BLD AUTO: 41.2 % (ref 36–46)
HDLC SERPL-MCNC: 50.4 MG/DL
HGB BLD-MCNC: 13.5 G/DL (ref 12–16)
IMM GRANULOCYTES # BLD AUTO: 0.01 X10*3/UL (ref 0–0.7)
IMM GRANULOCYTES NFR BLD AUTO: 0.2 % (ref 0–0.9)
LDLC SERPL CALC-MCNC: 105 MG/DL
LYMPHOCYTES # BLD AUTO: 1.63 X10*3/UL (ref 1.2–4.8)
LYMPHOCYTES NFR BLD AUTO: 37 %
MCH RBC QN AUTO: 30.8 PG (ref 26–34)
MCHC RBC AUTO-ENTMCNC: 32.8 G/DL (ref 32–36)
MCV RBC AUTO: 94 FL (ref 80–100)
MONOCYTES # BLD AUTO: 0.4 X10*3/UL (ref 0.1–1)
MONOCYTES NFR BLD AUTO: 9.1 %
NEUTROPHILS # BLD AUTO: 2.14 X10*3/UL (ref 1.2–7.7)
NEUTROPHILS NFR BLD AUTO: 48.5 %
NON HDL CHOLESTEROL: 127 MG/DL (ref 0–149)
NRBC BLD-RTO: 0 /100 WBCS (ref 0–0)
PLATELET # BLD AUTO: 268 X10*3/UL (ref 150–450)
POTASSIUM SERPL-SCNC: 4.7 MMOL/L (ref 3.5–5.3)
PROT SERPL-MCNC: 6.5 G/DL (ref 6.4–8.2)
RBC # BLD AUTO: 4.39 X10*6/UL (ref 4–5.2)
SODIUM SERPL-SCNC: 143 MMOL/L (ref 136–145)
TRIGL SERPL-MCNC: 107 MG/DL (ref 0–149)
TSH SERPL-ACNC: 0.93 MIU/L (ref 0.44–3.98)
VLDL: 21 MG/DL (ref 0–40)
WBC # BLD AUTO: 4.4 X10*3/UL (ref 4.4–11.3)

## 2024-01-06 PROCEDURE — 36415 COLL VENOUS BLD VENIPUNCTURE: CPT

## 2024-01-06 PROCEDURE — 83036 HEMOGLOBIN GLYCOSYLATED A1C: CPT

## 2024-01-29 DIAGNOSIS — E78.00 HYPERCHOLESTEROLEMIA: ICD-10-CM

## 2024-01-29 RX ORDER — SIMVASTATIN 10 MG/1
10 TABLET, FILM COATED ORAL NIGHTLY
Qty: 90 TABLET | Refills: 0 | Status: SHIPPED | OUTPATIENT
Start: 2024-01-29 | End: 2024-04-16

## 2024-02-01 DIAGNOSIS — I10 BENIGN HYPERTENSION: ICD-10-CM

## 2024-02-02 RX ORDER — HYDROGEN PEROXIDE 3 %
20 SOLUTION, NON-ORAL MISCELLANEOUS DAILY
Qty: 90 CAPSULE | Refills: 0 | Status: SHIPPED | OUTPATIENT
Start: 2024-02-02 | End: 2024-05-22

## 2024-02-09 ENCOUNTER — APPOINTMENT (OUTPATIENT)
Dept: OBSTETRICS AND GYNECOLOGY | Facility: CLINIC | Age: 69
End: 2024-02-09
Payer: MEDICARE

## 2024-02-14 ENCOUNTER — OFFICE VISIT (OUTPATIENT)
Dept: OBSTETRICS AND GYNECOLOGY | Facility: CLINIC | Age: 69
End: 2024-02-14
Payer: MEDICARE

## 2024-02-14 VITALS — WEIGHT: 196 LBS | SYSTOLIC BLOOD PRESSURE: 120 MMHG | DIASTOLIC BLOOD PRESSURE: 78 MMHG | BODY MASS INDEX: 37.03 KG/M2

## 2024-02-14 DIAGNOSIS — Z01.419 WELL WOMAN EXAM: ICD-10-CM

## 2024-02-14 PROCEDURE — 1160F RVW MEDS BY RX/DR IN RCRD: CPT | Performed by: MIDWIFE

## 2024-02-14 PROCEDURE — 99387 INIT PM E/M NEW PAT 65+ YRS: CPT | Performed by: MIDWIFE

## 2024-02-14 PROCEDURE — 3078F DIAST BP <80 MM HG: CPT | Performed by: MIDWIFE

## 2024-02-14 PROCEDURE — 3074F SYST BP LT 130 MM HG: CPT | Performed by: MIDWIFE

## 2024-02-14 PROCEDURE — 1036F TOBACCO NON-USER: CPT | Performed by: MIDWIFE

## 2024-02-14 PROCEDURE — 1159F MED LIST DOCD IN RCRD: CPT | Performed by: MIDWIFE

## 2024-02-14 NOTE — PROGRESS NOTES
"Subjective   Patient ID: Brinda Ruiz is a 68 y.o.  female who presents for Annual Exam.   HPI  PMHx: last pap 2017 NIL Neg; BTL; vaginal births x4  SocHx:  14 yrs ago not SA since; nonsmoker  Fhx: mother had vulvar CA but had not been to a provider \"for years\" prior to dx  ROS: no pelvic pain, no urinary c/o, NAD  Review of Systems   All other systems reviewed and are negative.      Objective   Physical Exam  Vitals and nursing note reviewed.   Constitutional:       Appearance: Normal appearance.   HENT:      Nose: Nose normal.   Eyes:      Pupils: Pupils are equal, round, and reactive to light.   Neck:      Thyroid: No thyroid mass.   Cardiovascular:      Rate and Rhythm: Normal rate and regular rhythm.   Pulmonary:      Effort: Pulmonary effort is normal.      Breath sounds: Normal breath sounds.   Chest:      Chest wall: No mass.   Breasts:     Right: Normal.      Left: Normal.   Abdominal:      Palpations: Abdomen is soft. There is no mass.      Tenderness: There is no abdominal tenderness.   Genitourinary:     General: Normal vulva.      Labia:         Right: No rash, tenderness or lesion.         Left: No rash, tenderness or lesion.       Vagina: Normal.      Cervix: Normal.      Uterus: Normal.       Adnexa: Right adnexa normal and left adnexa normal.   Musculoskeletal:         General: Normal range of motion.   Lymphadenopathy:      Cervical: No cervical adenopathy.      Lower Body: No right inguinal adenopathy. No left inguinal adenopathy.   Skin:     General: Skin is warm and dry.   Neurological:      Mental Status: She is alert and oriented to person, place, and time.      Motor: Motor function is intact.      Gait: Gait is intact.   Psychiatric:         Mood and Affect: Mood normal.         Assessment/Plan   Diagnoses and all orders for this visit:  Well woman exam  -     Referral to Gynecology       Reassurance given re exam findings  Mammogram ordered by PCP  RTO AE/PRN    Mary FIGUEROA " AMANDA Hinojosa-ALKA, ND 02/14/24 1:16 PM

## 2024-02-21 DIAGNOSIS — E87.6 HYPOKALEMIA: ICD-10-CM

## 2024-02-21 DIAGNOSIS — I10 BENIGN ESSENTIAL HYPERTENSION: ICD-10-CM

## 2024-02-21 RX ORDER — POTASSIUM CHLORIDE 750 MG/1
10 TABLET, FILM COATED, EXTENDED RELEASE ORAL DAILY
Qty: 90 TABLET | Refills: 1 | Status: SHIPPED | OUTPATIENT
Start: 2024-02-21

## 2024-02-21 RX ORDER — VERAPAMIL HYDROCHLORIDE 240 MG/1
240 TABLET, FILM COATED, EXTENDED RELEASE ORAL DAILY
Qty: 90 TABLET | Refills: 1 | Status: SHIPPED | OUTPATIENT
Start: 2024-02-21 | End: 2024-05-23

## 2024-03-31 DIAGNOSIS — J45.20 MILD INTERMITTENT ASTHMATIC BRONCHITIS WITHOUT COMPLICATION (HHS-HCC): ICD-10-CM

## 2024-04-01 RX ORDER — ALBUTEROL SULFATE 90 UG/1
AEROSOL, METERED RESPIRATORY (INHALATION)
Qty: 25.5 G | Refills: 1 | Status: SHIPPED | OUTPATIENT
Start: 2024-04-01

## 2024-04-16 DIAGNOSIS — E78.00 HYPERCHOLESTEROLEMIA: ICD-10-CM

## 2024-04-16 RX ORDER — SIMVASTATIN 10 MG/1
TABLET, FILM COATED ORAL
Qty: 90 TABLET | Refills: 0 | Status: SHIPPED | OUTPATIENT
Start: 2024-04-16

## 2024-05-17 ENCOUNTER — HOSPITAL ENCOUNTER (OUTPATIENT)
Dept: RADIOLOGY | Facility: HOSPITAL | Age: 69
Discharge: HOME | End: 2024-05-17
Payer: MEDICARE

## 2024-05-17 VITALS — WEIGHT: 196 LBS | HEIGHT: 61 IN | BODY MASS INDEX: 37 KG/M2

## 2024-05-17 DIAGNOSIS — Z12.31 ENCOUNTER FOR SCREENING MAMMOGRAM FOR BREAST CANCER: ICD-10-CM

## 2024-05-17 PROCEDURE — 77067 SCR MAMMO BI INCL CAD: CPT

## 2024-05-17 PROCEDURE — 77067 SCR MAMMO BI INCL CAD: CPT | Performed by: RADIOLOGY

## 2024-05-17 PROCEDURE — 77063 BREAST TOMOSYNTHESIS BI: CPT | Performed by: RADIOLOGY

## 2024-05-22 DIAGNOSIS — I10 BENIGN HYPERTENSION: ICD-10-CM

## 2024-05-22 DIAGNOSIS — I10 BENIGN ESSENTIAL HYPERTENSION: ICD-10-CM

## 2024-05-22 RX ORDER — HYDROGEN PEROXIDE 3 %
SOLUTION, NON-ORAL MISCELLANEOUS
Qty: 90 CAPSULE | Refills: 0 | Status: SHIPPED | OUTPATIENT
Start: 2024-05-22

## 2024-05-23 RX ORDER — VERAPAMIL HYDROCHLORIDE 240 MG/1
240 TABLET, FILM COATED, EXTENDED RELEASE ORAL DAILY
Qty: 90 TABLET | Refills: 1 | Status: SHIPPED | OUTPATIENT
Start: 2024-05-23

## 2024-06-10 DIAGNOSIS — I10 BENIGN HYPERTENSION: ICD-10-CM

## 2024-06-14 RX ORDER — OLMESARTAN MEDOXOMIL AND HYDROCHLOROTHIAZIDE 40/25 40; 25 MG/1; MG/1
1 TABLET ORAL DAILY
Qty: 90 TABLET | Refills: 0 | Status: SHIPPED | OUTPATIENT
Start: 2024-06-14

## 2024-06-19 ENCOUNTER — APPOINTMENT (OUTPATIENT)
Dept: PRIMARY CARE | Facility: CLINIC | Age: 69
End: 2024-06-19
Payer: MEDICARE

## 2024-06-19 VITALS
HEART RATE: 71 BPM | BODY MASS INDEX: 37.56 KG/M2 | WEIGHT: 198.8 LBS | SYSTOLIC BLOOD PRESSURE: 108 MMHG | TEMPERATURE: 96.5 F | OXYGEN SATURATION: 96 % | DIASTOLIC BLOOD PRESSURE: 62 MMHG

## 2024-06-19 DIAGNOSIS — F32.5 DEPRESSION, MAJOR, IN REMISSION (CMS-HCC): ICD-10-CM

## 2024-06-19 DIAGNOSIS — E66.01 OBESITY, MORBID (MULTI): ICD-10-CM

## 2024-06-19 DIAGNOSIS — K76.0 NONALCOHOLIC HEPATOSTEATOSIS: ICD-10-CM

## 2024-06-19 DIAGNOSIS — E55.9 VITAMIN D DEFICIENCY: ICD-10-CM

## 2024-06-19 DIAGNOSIS — I10 BENIGN HYPERTENSION: Primary | ICD-10-CM

## 2024-06-19 DIAGNOSIS — D32.9 MENINGIOMA (MULTI): ICD-10-CM

## 2024-06-19 DIAGNOSIS — J45.20 MILD INTERMITTENT ASTHMATIC BRONCHITIS WITHOUT COMPLICATION (HHS-HCC): ICD-10-CM

## 2024-06-19 DIAGNOSIS — E78.5 HYPERLIPIDEMIA, UNSPECIFIED HYPERLIPIDEMIA TYPE: ICD-10-CM

## 2024-06-19 DIAGNOSIS — J45.990 EXERCISE-INDUCED ASTHMA (HHS-HCC): ICD-10-CM

## 2024-06-19 DIAGNOSIS — Z00.00 ANNUAL PHYSICAL EXAM: ICD-10-CM

## 2024-06-19 PROCEDURE — 1036F TOBACCO NON-USER: CPT | Performed by: INTERNAL MEDICINE

## 2024-06-19 PROCEDURE — 3074F SYST BP LT 130 MM HG: CPT | Performed by: INTERNAL MEDICINE

## 2024-06-19 PROCEDURE — 1159F MED LIST DOCD IN RCRD: CPT | Performed by: INTERNAL MEDICINE

## 2024-06-19 PROCEDURE — 3078F DIAST BP <80 MM HG: CPT | Performed by: INTERNAL MEDICINE

## 2024-06-19 PROCEDURE — 1160F RVW MEDS BY RX/DR IN RCRD: CPT | Performed by: INTERNAL MEDICINE

## 2024-06-19 PROCEDURE — 99214 OFFICE O/P EST MOD 30 MIN: CPT | Performed by: INTERNAL MEDICINE

## 2024-06-19 RX ORDER — ALBUTEROL SULFATE 90 UG/1
AEROSOL, METERED RESPIRATORY (INHALATION)
Qty: 25.5 G | Refills: 1 | Status: SHIPPED | OUTPATIENT
Start: 2024-06-19 | End: 2024-06-19 | Stop reason: SDUPTHER

## 2024-06-19 RX ORDER — ALBUTEROL SULFATE 90 UG/1
AEROSOL, METERED RESPIRATORY (INHALATION)
Qty: 25.5 G | Refills: 1 | Status: SHIPPED | OUTPATIENT
Start: 2024-06-19

## 2024-06-19 ASSESSMENT — ENCOUNTER SYMPTOMS
HEADACHES: 0
DIFFICULTY URINATING: 0
BLOOD IN STOOL: 0
DIARRHEA: 0
FEVER: 0
UNEXPECTED WEIGHT CHANGE: 0
SORE THROAT: 0
BRUISES/BLEEDS EASILY: 0
PALPITATIONS: 0
ARTHRALGIAS: 0
FATIGUE: 0
SINUS PAIN: 0
ABDOMINAL PAIN: 0
COUGH: 0
WHEEZING: 0
HYPERTENSION: 1
DIZZINESS: 0

## 2024-06-19 NOTE — PROGRESS NOTES
Subjective   Patient ID: Brinda Ruiz is a 68 y.o. female who presents for Hypertension, Results, and blood blister (On index finger of rt hand x 1 week).    - Exercise-induced asthma continue with control as needed new prescription provided  - Hypertension controlled continue with current recommended medication continue low-salt diet patient exercising daily  - History of hepatic steatosis counseled about weight loss counseled about BMI repeat liver ultrasound previous hepatitis C screening negative  Patient brother  from hepatic cirrhosis and possible cancer  Counseled about monitoring and weight loss  Exercise calorie counting  -History of hemorrhoids and hemorrhoidectomy doing well no recurrence  -Irritable bowel syndrome only on diet controlled asymptomatic not taking any medication at this time continue with high-fiber diet  -Meningioma in remission, ,-Patient underwent gamma knife treatment for meningioma patient doing much better no recurrence  -Major depression now in remission  --Reflux disease controlled patient did not tolerate a smaller dose patient on 40 milligrams daily counseled about magnesium and potassium replacements  -Hypercholesterolemia on low fat diet continue simvastatin 10 mg.  Repeat lipid profile in 6 months  Follow-up 6 months needs blood work to complete physical exam next appointment      Hypertension  Pertinent negatives include no chest pain, headaches or palpitations.          Review of Systems   Constitutional:  Negative for fatigue, fever and unexpected weight change.   HENT:  Negative for congestion, ear discharge, ear pain, mouth sores, sinus pain and sore throat.    Eyes:  Negative for visual disturbance.   Respiratory:  Negative for cough and wheezing.    Cardiovascular:  Negative for chest pain, palpitations and leg swelling.   Gastrointestinal:  Negative for abdominal pain, blood in stool and diarrhea.   Genitourinary:  Negative for difficulty urinating.    Musculoskeletal:  Negative for arthralgias.   Skin:  Negative for rash.   Neurological:  Negative for dizziness and headaches.   Hematological:  Does not bruise/bleed easily.   Psychiatric/Behavioral:  Negative for behavioral problems.    All other systems reviewed and are negative.      Objective   Lab Results   Component Value Date    HGBA1C 5.0 01/06/2024      /62   Pulse 71   Temp 35.8 °C (96.5 °F)   Wt 90.2 kg (198 lb 12.8 oz)   SpO2 96%   BMI 37.56 kg/m²   Lab Results   Component Value Date    WBC 4.4 01/06/2024    HGB 13.5 01/06/2024    HCT 41.2 01/06/2024     01/06/2024    CHOL 177 01/06/2024    TRIG 107 01/06/2024    HDL 50.4 01/06/2024    ALT 24 01/06/2024    AST 16 01/06/2024     01/06/2024    K 4.7 01/06/2024     01/06/2024    CREATININE 0.67 01/06/2024    BUN 22 01/06/2024    CO2 29 01/06/2024    TSH 0.93 01/06/2024    HGBA1C 5.0 01/06/2024     par   Physical Exam  Vitals and nursing note reviewed.   Constitutional:       Appearance: Normal appearance.   HENT:      Head: Normocephalic.      Nose: Nose normal.   Eyes:      Conjunctiva/sclera: Conjunctivae normal.      Pupils: Pupils are equal, round, and reactive to light.   Cardiovascular:      Rate and Rhythm: Regular rhythm.   Pulmonary:      Effort: Pulmonary effort is normal.      Breath sounds: Normal breath sounds.   Abdominal:      General: Abdomen is flat.      Palpations: Abdomen is soft.   Musculoskeletal:      Cervical back: Neck supple.   Skin:     General: Skin is warm.   Neurological:      General: No focal deficit present.      Mental Status: She is oriented to person, place, and time.   Psychiatric:         Mood and Affect: Mood normal.         Assessment/Plan   Brinda was seen today for hypertension, results and blood blister.  Diagnoses and all orders for this visit:  Benign hypertension (Primary)  -     CBC and Auto Differential; Future  Exercise-induced asthma (HHS-HCC)  Mild intermittent asthmatic  bronchitis without complication ([facility]-HCC)  -     Discontinue: albuterol 90 mcg/actuation inhaler; USE 2 INHALATIONS ORALLY   EVERY 6 HOURS AS NEEDED FORWHEEZING.  -     albuterol 90 mcg/actuation inhaler; USE 2 INHALATIONS ORALLY   EVERY 6 HOURS AS NEEDED FORWHEEZING.  Depression, major, in remission (CMS-HCC)  Obesity, morbid (Multi)  Meningioma (Multi)  Hyperlipidemia, unspecified hyperlipidemia type  Nonalcoholic hepatosteatosis  Annual physical exam  -     CBC and Auto Differential; Future  -     Comprehensive Metabolic Panel; Future  -     Lipid Panel; Future  -     TSH with reflex to Free T4 if abnormal; Future  -     Vitamin D 25-Hydroxy,Total (for eval of Vitamin D levels); Future  Vitamin D deficiency  -     Vitamin D 25-Hydroxy,Total (for eval of Vitamin D levels); Future  Other orders  -     Follow Up In Primary Care - Medicare Annual; Future   - Exercise-induced asthma continue with control as needed new prescription provided  - Hypertension controlled continue with current recommended medication continue low-salt diet patient exercising daily  - History of hepatic steatosis counseled about weight loss counseled about BMI repeat liver ultrasound previous hepatitis C screening negative  Patient brother  from hepatic cirrhosis and possible cancer  Counseled about monitoring and weight loss  Exercise calorie counting  -History of hemorrhoids and hemorrhoidectomy doing well no recurrence  -Irritable bowel syndrome only on diet controlled asymptomatic not taking any medication at this time continue with high-fiber diet  -Meningioma in remission, ,-Patient underwent gamma knife treatment for meningioma patient doing much better no recurrence  -Major depression now in remission  --Reflux disease controlled patient did not tolerate a smaller dose patient on 40 milligrams daily counseled about magnesium and potassium replacements  -Hypercholesterolemia on low fat diet continue simvastatin 10 mg.  Repeat lipid  profile in 6 months  Follow-up 6 months needs blood work to complete physical exam next appointment

## 2024-07-15 DIAGNOSIS — E78.00 HYPERCHOLESTEROLEMIA: ICD-10-CM

## 2024-07-16 RX ORDER — SIMVASTATIN 10 MG/1
TABLET, FILM COATED ORAL
Qty: 90 TABLET | Refills: 0 | Status: SHIPPED | OUTPATIENT
Start: 2024-07-16

## 2024-08-07 DIAGNOSIS — E87.6 HYPOKALEMIA: ICD-10-CM

## 2024-08-07 RX ORDER — POTASSIUM CHLORIDE 750 MG/1
TABLET, EXTENDED RELEASE ORAL
Qty: 90 TABLET | Refills: 1 | Status: SHIPPED | OUTPATIENT
Start: 2024-08-07

## 2024-09-03 DIAGNOSIS — I10 BENIGN HYPERTENSION: ICD-10-CM

## 2024-09-05 RX ORDER — OLMESARTAN MEDOXOMIL AND HYDROCHLOROTHIAZIDE 40/25 40; 25 MG/1; MG/1
1 TABLET ORAL DAILY
Qty: 90 TABLET | Refills: 1 | Status: SHIPPED | OUTPATIENT
Start: 2024-09-05

## 2024-09-11 DIAGNOSIS — I10 BENIGN HYPERTENSION: ICD-10-CM

## 2024-09-11 DIAGNOSIS — E78.00 HYPERCHOLESTEROLEMIA: ICD-10-CM

## 2024-09-11 RX ORDER — HYDROGEN PEROXIDE 3 %
20 SOLUTION, NON-ORAL MISCELLANEOUS
Qty: 90 CAPSULE | Refills: 0 | Status: SHIPPED | OUTPATIENT
Start: 2024-09-11

## 2024-09-11 RX ORDER — SIMVASTATIN 10 MG/1
TABLET, FILM COATED ORAL
Qty: 90 TABLET | Refills: 1 | Status: SHIPPED | OUTPATIENT
Start: 2024-09-11

## 2024-09-11 RX ORDER — OLMESARTAN MEDOXOMIL AND HYDROCHLOROTHIAZIDE 40/25 40; 25 MG/1; MG/1
1 TABLET ORAL DAILY
Qty: 90 TABLET | Refills: 1 | Status: SHIPPED | OUTPATIENT
Start: 2024-09-11

## 2024-11-23 DIAGNOSIS — I10 BENIGN HYPERTENSION: ICD-10-CM

## 2024-11-25 RX ORDER — HYDROGEN PEROXIDE 3 %
SOLUTION, NON-ORAL MISCELLANEOUS
Qty: 90 CAPSULE | Refills: 1 | Status: SHIPPED | OUTPATIENT
Start: 2024-11-25

## 2025-01-15 ENCOUNTER — APPOINTMENT (OUTPATIENT)
Dept: PRIMARY CARE | Facility: CLINIC | Age: 70
End: 2025-01-15
Payer: MEDICARE

## 2025-01-15 VITALS
WEIGHT: 197.8 LBS | TEMPERATURE: 97.4 F | DIASTOLIC BLOOD PRESSURE: 74 MMHG | OXYGEN SATURATION: 98 % | HEIGHT: 61 IN | BODY MASS INDEX: 37.34 KG/M2 | HEART RATE: 65 BPM | SYSTOLIC BLOOD PRESSURE: 108 MMHG

## 2025-01-15 DIAGNOSIS — I10 BENIGN ESSENTIAL HYPERTENSION: ICD-10-CM

## 2025-01-15 DIAGNOSIS — I10 BENIGN HYPERTENSION: ICD-10-CM

## 2025-01-15 DIAGNOSIS — D32.9 MENINGIOMA (MULTI): ICD-10-CM

## 2025-01-15 DIAGNOSIS — K76.0 NONALCOHOLIC HEPATOSTEATOSIS: ICD-10-CM

## 2025-01-15 DIAGNOSIS — E87.6 HYPOKALEMIA: ICD-10-CM

## 2025-01-15 DIAGNOSIS — Z00.00 ROUTINE GENERAL MEDICAL EXAMINATION AT HEALTH CARE FACILITY: Primary | ICD-10-CM

## 2025-01-15 DIAGNOSIS — J45.990 EXERCISE-INDUCED ASTHMA (HHS-HCC): ICD-10-CM

## 2025-01-15 DIAGNOSIS — E78.5 HYPERLIPIDEMIA, UNSPECIFIED HYPERLIPIDEMIA TYPE: ICD-10-CM

## 2025-01-15 PROCEDURE — 1123F ACP DISCUSS/DSCN MKR DOCD: CPT | Performed by: INTERNAL MEDICINE

## 2025-01-15 PROCEDURE — 1158F ADVNC CARE PLAN TLK DOCD: CPT | Performed by: INTERNAL MEDICINE

## 2025-01-15 PROCEDURE — 3008F BODY MASS INDEX DOCD: CPT | Performed by: INTERNAL MEDICINE

## 2025-01-15 PROCEDURE — 1160F RVW MEDS BY RX/DR IN RCRD: CPT | Performed by: INTERNAL MEDICINE

## 2025-01-15 PROCEDURE — 1036F TOBACCO NON-USER: CPT | Performed by: INTERNAL MEDICINE

## 2025-01-15 PROCEDURE — 3078F DIAST BP <80 MM HG: CPT | Performed by: INTERNAL MEDICINE

## 2025-01-15 PROCEDURE — 99214 OFFICE O/P EST MOD 30 MIN: CPT | Performed by: INTERNAL MEDICINE

## 2025-01-15 PROCEDURE — 3074F SYST BP LT 130 MM HG: CPT | Performed by: INTERNAL MEDICINE

## 2025-01-15 PROCEDURE — 99397 PER PM REEVAL EST PAT 65+ YR: CPT | Performed by: INTERNAL MEDICINE

## 2025-01-15 PROCEDURE — 1159F MED LIST DOCD IN RCRD: CPT | Performed by: INTERNAL MEDICINE

## 2025-01-15 PROCEDURE — G0439 PPPS, SUBSEQ VISIT: HCPCS | Performed by: INTERNAL MEDICINE

## 2025-01-15 PROCEDURE — 1170F FXNL STATUS ASSESSED: CPT | Performed by: INTERNAL MEDICINE

## 2025-01-15 RX ORDER — POTASSIUM CHLORIDE 750 MG/1
10 TABLET, FILM COATED, EXTENDED RELEASE ORAL DAILY
Qty: 90 TABLET | Refills: 1 | Status: SHIPPED | OUTPATIENT
Start: 2025-01-15

## 2025-01-15 RX ORDER — VERAPAMIL HCL 240 MG
240 TABLET, EXTENDED RELEASE ORAL DAILY
Qty: 90 TABLET | Refills: 1 | Status: SHIPPED | OUTPATIENT
Start: 2025-01-15

## 2025-01-15 ASSESSMENT — ENCOUNTER SYMPTOMS
BRUISES/BLEEDS EASILY: 0
BLOOD IN STOOL: 0
SORE THROAT: 0
DIFFICULTY URINATING: 0
DIARRHEA: 0
ARTHRALGIAS: 0
DIZZINESS: 0
SINUS PAIN: 0
PALPITATIONS: 0
FATIGUE: 0
ABDOMINAL PAIN: 0
FEVER: 0
COUGH: 0
HEADACHES: 0
UNEXPECTED WEIGHT CHANGE: 0
WHEEZING: 0

## 2025-01-15 ASSESSMENT — PATIENT HEALTH QUESTIONNAIRE - PHQ9
SUM OF ALL RESPONSES TO PHQ9 QUESTIONS 1 AND 2: 0
1. LITTLE INTEREST OR PLEASURE IN DOING THINGS: NOT AT ALL
2. FEELING DOWN, DEPRESSED OR HOPELESS: NOT AT ALL

## 2025-01-15 ASSESSMENT — ACTIVITIES OF DAILY LIVING (ADL)
TAKING_MEDICATION: INDEPENDENT
DRESSING: INDEPENDENT
GROCERY_SHOPPING: INDEPENDENT
BATHING: INDEPENDENT
DOING_HOUSEWORK: INDEPENDENT

## 2025-01-15 NOTE — ASSESSMENT & PLAN NOTE
Orders:    verapamil SR (Calan-SR) 240 mg ER tablet; Take 1 tablet (240 mg) by mouth once daily.

## 2025-01-15 NOTE — PROGRESS NOTES
"Subjective   Patient ID: Brinda Ruiz is a 69 y.o. female who presents for Medicare Annual Wellness Visit Subsequent.    HPI       Review of Systems    Objective   Lab Results   Component Value Date    HGBA1C 5.0 01/06/2024      /74   Pulse 65   Temp 36.3 °C (97.4 °F)   Ht 1.537 m (5' 0.5\")   Wt 89.7 kg (197 lb 12.8 oz)   SpO2 98%   BMI 37.99 kg/m²     Physical Exam    Assessment/Plan   Brinda was seen today for medicare annual wellness visit subsequent.  Diagnoses and all orders for this visit:  Hypokalemia  Benign essential hypertension  Other orders  -     Follow Up In Primary Care - Medicare Annual     "

## 2025-01-15 NOTE — ASSESSMENT & PLAN NOTE
Orders:    potassium chloride CR (Klor-Con M10) 10 mEq ER tablet; Take 1 tablet (10 mEq) by mouth once daily. Do not crush, chew, or split.

## 2025-01-16 NOTE — ASSESSMENT & PLAN NOTE
Annual preventive visit  - Vaccinations reviewed and up-to-date  - Screen for colon cancer up-to-date  - Needs screening program  - Screen for depression negative  Need to proceed with blood work    Follow-up  - Underlying history of meningioma status post gamma knife treatment patient did not follow-up surveillance as recommended referred patient to neuro-oncology again for further recommendation no history of seizures no complaints  - Exercise-induced asthma continue with control as needed new prescription provided  - Hypertension controlled continue with current recommended medication continue low-salt diet patient exercising daily  - History of hepatic steatosis counseled about weight loss counseled about BMI repeat liver ultrasound previous hepatitis C screening negative  Patient brother  from hepatic cirrhosis and possible cancer  Counseled about monitoring and weight loss  Exercise calorie counting  -History of hemorrhoids and hemorrhoidectomy doing well no recurrence  -Irritable bowel syndrome only on diet controlled asymptomatic not taking any medication at this time continue with high-fiber diet  -Meningioma in remission, ,-Patient underwent gamma knife treatment for meningioma patient doing much better no recurrence  -Major depression now in remission  --Reflux disease controlled patient did not tolerate a smaller dose patient on 40 milligrams daily counseled about magnesium and potassium replacements  -Hypercholesterolemia on low fat diet continue simvastatin 10 mg.  Repeat lipid profile in 6 months  Follow-up 3 months

## 2025-01-21 ENCOUNTER — LAB (OUTPATIENT)
Dept: LAB | Facility: LAB | Age: 70
End: 2025-01-21
Payer: MEDICARE

## 2025-01-21 DIAGNOSIS — E55.9 VITAMIN D DEFICIENCY: ICD-10-CM

## 2025-01-21 DIAGNOSIS — Z00.00 ANNUAL PHYSICAL EXAM: ICD-10-CM

## 2025-01-21 DIAGNOSIS — I10 BENIGN HYPERTENSION: ICD-10-CM

## 2025-01-21 LAB
25(OH)D3 SERPL-MCNC: 55 NG/ML (ref 30–100)
ALBUMIN SERPL BCP-MCNC: 4.2 G/DL (ref 3.4–5)
ALP SERPL-CCNC: 73 U/L (ref 33–136)
ALT SERPL W P-5'-P-CCNC: 39 U/L (ref 7–45)
ANION GAP SERPL CALC-SCNC: 10 MMOL/L (ref 10–20)
AST SERPL W P-5'-P-CCNC: 21 U/L (ref 9–39)
BASOPHILS # BLD AUTO: 0.03 X10*3/UL (ref 0–0.1)
BASOPHILS NFR BLD AUTO: 0.9 %
BILIRUB SERPL-MCNC: 0.7 MG/DL (ref 0–1.2)
BUN SERPL-MCNC: 11 MG/DL (ref 6–23)
CALCIUM SERPL-MCNC: 9.5 MG/DL (ref 8.6–10.3)
CHLORIDE SERPL-SCNC: 104 MMOL/L (ref 98–107)
CHOLEST SERPL-MCNC: 143 MG/DL (ref 0–199)
CHOLESTEROL/HDL RATIO: 3.3
CO2 SERPL-SCNC: 31 MMOL/L (ref 21–32)
CREAT SERPL-MCNC: 0.71 MG/DL (ref 0.5–1.05)
EGFRCR SERPLBLD CKD-EPI 2021: >90 ML/MIN/1.73M*2
EOSINOPHIL # BLD AUTO: 0.17 X10*3/UL (ref 0–0.4)
EOSINOPHIL NFR BLD AUTO: 5.2 %
ERYTHROCYTE [DISTWIDTH] IN BLOOD BY AUTOMATED COUNT: 12.6 % (ref 11.5–14.5)
GLUCOSE SERPL-MCNC: 119 MG/DL (ref 74–99)
HCT VFR BLD AUTO: 40.8 % (ref 36–46)
HDLC SERPL-MCNC: 43.1 MG/DL
HGB BLD-MCNC: 13.8 G/DL (ref 12–16)
IMM GRANULOCYTES # BLD AUTO: 0.01 X10*3/UL (ref 0–0.5)
IMM GRANULOCYTES NFR BLD AUTO: 0.3 % (ref 0–0.9)
LDLC SERPL CALC-MCNC: 79 MG/DL
LYMPHOCYTES # BLD AUTO: 1.27 X10*3/UL (ref 0.8–3)
LYMPHOCYTES NFR BLD AUTO: 38.6 %
MCH RBC QN AUTO: 30.9 PG (ref 26–34)
MCHC RBC AUTO-ENTMCNC: 33.8 G/DL (ref 32–36)
MCV RBC AUTO: 91 FL (ref 80–100)
MONOCYTES # BLD AUTO: 0.37 X10*3/UL (ref 0.05–0.8)
MONOCYTES NFR BLD AUTO: 11.2 %
NEUTROPHILS # BLD AUTO: 1.44 X10*3/UL (ref 1.6–5.5)
NEUTROPHILS NFR BLD AUTO: 43.8 %
NON HDL CHOLESTEROL: 100 MG/DL (ref 0–149)
NRBC BLD-RTO: 0 /100 WBCS (ref 0–0)
PLATELET # BLD AUTO: 216 X10*3/UL (ref 150–450)
POTASSIUM SERPL-SCNC: 3.9 MMOL/L (ref 3.5–5.3)
PROT SERPL-MCNC: 6.7 G/DL (ref 6.4–8.2)
RBC # BLD AUTO: 4.47 X10*6/UL (ref 4–5.2)
SODIUM SERPL-SCNC: 141 MMOL/L (ref 136–145)
TRIGL SERPL-MCNC: 103 MG/DL (ref 0–149)
TSH SERPL-ACNC: 1.15 MIU/L (ref 0.44–3.98)
VLDL: 21 MG/DL (ref 0–40)
WBC # BLD AUTO: 3.3 X10*3/UL (ref 4.4–11.3)

## 2025-01-21 PROCEDURE — 82306 VITAMIN D 25 HYDROXY: CPT

## 2025-01-31 ENCOUNTER — TELEPHONE (OUTPATIENT)
Dept: HEMATOLOGY/ONCOLOGY | Facility: CLINIC | Age: 70
End: 2025-01-31
Payer: MEDICARE

## 2025-01-31 NOTE — TELEPHONE ENCOUNTER
Attempted to call patient to see if she has had any recent MRI's of her brain since 2018, no answer, voice message left to call our office back

## 2025-02-06 ENCOUNTER — TELEPHONE (OUTPATIENT)
Dept: HEMATOLOGY/ONCOLOGY | Facility: HOSPITAL | Age: 70
End: 2025-02-06
Payer: MEDICARE

## 2025-02-06 NOTE — TELEPHONE ENCOUNTER
Called and spoke with Brinda. Gave her the main office line [phone] opt. 5, opt. 2. I let her know she'll see Dr. Shaikh tomorrow 2/7 in [address] - take elevators to 4th floor, follow the windows all the way to the back left. She repeated the information back to me and does not have any other concerns at this time.

## 2025-02-06 NOTE — TELEPHONE ENCOUNTER
Called and spoke with patient. She has not had any MRIs since 2018. I will call her back when she is able to write down the office number around 1100.    Gin DE JESUS RN

## 2025-02-07 ENCOUNTER — PATIENT OUTREACH (OUTPATIENT)
Dept: HEMATOLOGY/ONCOLOGY | Facility: CLINIC | Age: 70
End: 2025-02-07

## 2025-02-07 ENCOUNTER — OFFICE VISIT (OUTPATIENT)
Dept: HEMATOLOGY/ONCOLOGY | Facility: CLINIC | Age: 70
End: 2025-02-07
Payer: MEDICARE

## 2025-02-07 VITALS
BODY MASS INDEX: 36.82 KG/M2 | SYSTOLIC BLOOD PRESSURE: 138 MMHG | TEMPERATURE: 97.2 F | DIASTOLIC BLOOD PRESSURE: 84 MMHG | HEART RATE: 76 BPM | OXYGEN SATURATION: 96 % | RESPIRATION RATE: 16 BRPM | WEIGHT: 195 LBS | HEIGHT: 61 IN

## 2025-02-07 DIAGNOSIS — D32.9 MENINGIOMA (MULTI): Primary | ICD-10-CM

## 2025-02-07 PROCEDURE — 99213 OFFICE O/P EST LOW 20 MIN: CPT | Performed by: PSYCHIATRY & NEUROLOGY

## 2025-02-07 PROCEDURE — 3075F SYST BP GE 130 - 139MM HG: CPT | Performed by: PSYCHIATRY & NEUROLOGY

## 2025-02-07 PROCEDURE — 99203 OFFICE O/P NEW LOW 30 MIN: CPT | Performed by: PSYCHIATRY & NEUROLOGY

## 2025-02-07 PROCEDURE — 1126F AMNT PAIN NOTED NONE PRSNT: CPT | Performed by: PSYCHIATRY & NEUROLOGY

## 2025-02-07 PROCEDURE — 3079F DIAST BP 80-89 MM HG: CPT | Performed by: PSYCHIATRY & NEUROLOGY

## 2025-02-07 PROCEDURE — 3008F BODY MASS INDEX DOCD: CPT | Performed by: PSYCHIATRY & NEUROLOGY

## 2025-02-07 PROCEDURE — 1159F MED LIST DOCD IN RCRD: CPT | Performed by: PSYCHIATRY & NEUROLOGY

## 2025-02-07 SDOH — ECONOMIC STABILITY: TRANSPORTATION INSECURITY
IN THE PAST 12 MONTHS, HAS THE LACK OF TRANSPORTATION KEPT YOU FROM MEDICAL APPOINTMENTS OR FROM GETTING MEDICATIONS?: NO

## 2025-02-07 SDOH — ECONOMIC STABILITY: INCOME INSECURITY: IN THE LAST 12 MONTHS, WAS THERE A TIME WHEN YOU WERE NOT ABLE TO PAY THE MORTGAGE OR RENT ON TIME?: NO

## 2025-02-07 SDOH — HEALTH STABILITY: PHYSICAL HEALTH: ON AVERAGE, HOW MANY DAYS PER WEEK DO YOU ENGAGE IN MODERATE TO STRENUOUS EXERCISE (LIKE A BRISK WALK)?: 5 DAYS

## 2025-02-07 SDOH — HEALTH STABILITY: PHYSICAL HEALTH
HOW OFTEN DO YOU NEED TO HAVE SOMEONE HELP YOU WHEN YOU READ INSTRUCTIONS, PAMPHLETS, OR OTHER WRITTEN MATERIAL FROM YOUR DOCTOR OR PHARMACY?: NEVER

## 2025-02-07 SDOH — ECONOMIC STABILITY: FOOD INSECURITY: WITHIN THE PAST 12 MONTHS, THE FOOD YOU BOUGHT JUST DIDN'T LAST AND YOU DIDN'T HAVE MONEY TO GET MORE.: NEVER TRUE

## 2025-02-07 SDOH — ECONOMIC STABILITY: FOOD INSECURITY: WITHIN THE PAST 12 MONTHS, YOU WORRIED THAT YOUR FOOD WOULD RUN OUT BEFORE YOU GOT MONEY TO BUY MORE.: NEVER TRUE

## 2025-02-07 SDOH — ECONOMIC STABILITY: GENERAL
WHICH OF THE FOLLOWING WOULD YOU LIKE TO GET CONNECTED TO IN ORDER TO RECEIVE A DISCOUNT OR FOR FREE? (CHOOSE ALL THAT APPLY): NO ASSISTANCE NEEDED

## 2025-02-07 SDOH — ECONOMIC STABILITY: GENERAL
WHICH OF THE FOLLOWING DO YOU KNOW HOW TO USE AND HAVE ACCESS TO EVERY DAY? (CHOOSE ALL THAT APPLY): SMARTPHONE WITH CELLULAR DATA PLAN

## 2025-02-07 SDOH — ECONOMIC STABILITY: TRANSPORTATION INSECURITY
IN THE PAST 12 MONTHS, HAS LACK OF TRANSPORTATION KEPT YOU FROM MEETINGS, WORK, OR FROM GETTING THINGS NEEDED FOR DAILY LIVING?: NO

## 2025-02-07 ASSESSMENT — PATIENT HEALTH QUESTIONNAIRE - PHQ9
2. FEELING DOWN, DEPRESSED OR HOPELESS: NOT AT ALL
1. LITTLE INTEREST OR PLEASURE IN DOING THINGS: NOT AT ALL
SUM OF ALL RESPONSES TO PHQ9 QUESTIONS 1 & 2: 0

## 2025-02-07 ASSESSMENT — LIFESTYLE VARIABLES
HOW OFTEN DO YOU HAVE SIX OR MORE DRINKS ON ONE OCCASION: NEVER
AUDIT-C TOTAL SCORE: 1
HOW OFTEN DO YOU HAVE A DRINK CONTAINING ALCOHOL: MONTHLY OR LESS
HOW MANY STANDARD DRINKS CONTAINING ALCOHOL DO YOU HAVE ON A TYPICAL DAY: 1 OR 2
SKIP TO QUESTIONS 9-10: 1

## 2025-02-07 ASSESSMENT — PAIN SCALES - GENERAL: PAINLEVEL_OUTOF10: 0-NO PAIN

## 2025-02-07 ASSESSMENT — SOCIAL DETERMINANTS OF HEALTH (SDOH)
WITHIN THE LAST YEAR, HAVE YOU BEEN HUMILIATED OR EMOTIONALLY ABUSED IN OTHER WAYS BY YOUR PARTNER OR EX-PARTNER?: NO
WITHIN THE LAST YEAR, HAVE TO BEEN RAPED OR FORCED TO HAVE ANY KIND OF SEXUAL ACTIVITY BY YOUR PARTNER OR EX-PARTNER?: NO
HOW OFTEN DO YOU GET TOGETHER WITH FRIENDS OR RELATIVES?: MORE THAN THREE TIMES A WEEK
DO YOU BELONG TO ANY CLUBS OR ORGANIZATIONS SUCH AS CHURCH GROUPS UNIONS, FRATERNAL OR ATHLETIC GROUPS, OR SCHOOL GROUPS?: YES
HOW HARD IS IT FOR YOU TO PAY FOR THE VERY BASICS LIKE FOOD, HOUSING, MEDICAL CARE, AND HEATING?: NOT VERY HARD
IN A TYPICAL WEEK, HOW MANY TIMES DO YOU TALK ON THE PHONE WITH FAMILY, FRIENDS, OR NEIGHBORS?: MORE THAN THREE TIMES A WEEK
IN THE PAST 12 MONTHS, HAS THE ELECTRIC, GAS, OIL, OR WATER COMPANY THREATENED TO SHUT OFF SERVICE IN YOUR HOME?: NO
WITHIN THE LAST YEAR, HAVE YOU BEEN KICKED, HIT, SLAPPED, OR OTHERWISE PHYSICALLY HURT BY YOUR PARTNER OR EX-PARTNER?: NO
HOW OFTEN DO YOU ATTEND CHURCH OR RELIGIOUS SERVICES?: 1 TO 4 TIMES PER YEAR
WITHIN THE LAST YEAR, HAVE YOU BEEN AFRAID OF YOUR PARTNER OR EX-PARTNER?: NO

## 2025-02-07 ASSESSMENT — ENCOUNTER SYMPTOMS
DEPRESSION: 0
LOSS OF SENSATION IN FEET: 0
OCCASIONAL FEELINGS OF UNSTEADINESS: 0

## 2025-03-23 NOTE — PROGRESS NOTES
[facility]  Neuro-Oncology Clinic        Patient ID: Brinda Ruiz  Referring Physician: Amy Benton MD  [address] [place] [address]  Primary Care Provider: Amy Benton MD      Subjective    HPI  Brinda Ruiz is a 69 y.o. female with a history of hypertension hyperlipidemia.  She also has a history of left middle cranial fossa meningioma for which she received gamma knife radiosurgery of 15 Gy on 4/5/2017 (per EMR).    Interval History   2/7/25 (initial clinic visit): Currently denies any headaches or visual changes.  Denies any new cognitive deficits.  Denies any recent falls.    Review of Systems - Oncology   10 point ROS negative except for that mentioned in the HPI    Past Medical History:   Diagnosis Date    Abdominal distension (gaseous) 06/17/2014    Abdominal bloating    Cyst of kidney, acquired 08/18/2014    Renal cyst    Depression, unspecified 10/22/2020    Depression    Diffuse otitis externa, bilateral 02/09/2016    Acute diffuse otitis externa of both ears    Essential (primary) hypertension 12/13/2022    Benign essential hypertension    Hyperlipidemia, unspecified 12/13/2022    Hyperlipidemia    Impacted cerumen, bilateral 09/16/2019    Bilateral impacted cerumen    Impacted cerumen, bilateral 02/09/2016    Impacted cerumen of both ears    Personal history of other diseases of urinary system 08/18/2014    History of hematuria    Personal history of other infectious and parasitic diseases 08/18/2014    History of viral warts    Personal history of urinary (tract) infections 08/26/2014    History of urinary tract infection    Plantar wart 08/18/2014    Plantar wart    Unspecified asthma, uncomplicated ([facility]) 10/22/2020    Asthma     Past Surgical History:   Procedure Laterality Date    COLONOSCOPY  10/31/2013    Complete Colonoscopy    COLONOSCOPY  01/30/2014    Complete Colonoscopy    TUBAL LIGATION  10/31/2013    Tubal Ligation  "      No family history on file.    Brinda Ruiz  reports that she has never smoked. She has never used smokeless tobacco.  She  reports that she does not currently use alcohol after a past usage of about 1.0 standard drink of alcohol per week.  She  reports no history of drug use.    PHYSICAL EXAM  Objective   BSA: 1.95 meters squared  /84   Pulse 76   Temp 36.2 °C (97.2 °F) (Core)   Resp 16   Ht 1.55 m (5' 1.02\")   Wt 88.5 kg (195 lb)   SpO2 96%   BMI 36.82 kg/m²   Karnofsky Score: 100 - Fully active, able to carry on all pre-disease performed without restriction    Gen: Awake and alert.  In no acute distress  CVS/Pulm: No dyspnea noted  Abdomen: Soft.  Nontender.  Nondistended    Neuro  Mental Status: Awake and alert.  Oriented to self and location.  Oriented to year.  Able to recall some of her medical history.  CN: Pupils equally round and reactive to light.  Extraocular muscles intact.  Visual fields full to finger counting.  No facial asymmetry.  Midline palate elevation and tongue protrusion.  Motor: 5/5 in all extremities  Sensation: Intact to light touch throughout  Gait: Normal gait.    LABS  No new lab studies.     IMAGING  === 10/10/18 ===    MRI BRAIN W WO CONTRAST    - Impression -  Stable to slight decrease in size of a 6 mm meningioma in the left  middle cranial fossa. There is no significant surrounding mass  effect. There is no edema in the brain parenchyma.    This study was interpreted at [facility].    MRI Brain 10/9/17 (radiology read)  Stable left middle cranial fossa meningioma compared to prior  examination dated 04/05/2017.    ASSESSMENT  Brinda Ruiz is a 69 y.o. female with a history of hypertension hyperlipidemia.  She also has a history of left middle cranial fossa meningioma for which she received gamma knife radiosurgery of 15 Gy on 4/5/2017 (per EMR).    PLAN    #Left middle cranial fossa meningioma  - Will obtain new MRI " brain with and without contrast since last MRI was in 2018  - Will attempt to see if we can get records of her radiation field from 2017    #Follow-up  - Return to clinic after MRI brain  - Call or return sooner should new or worsening neurological deficits occur    I personally spent 30 minutes today, exclusive of procedures, providing care for this patient, including preparation, face to face time, documentation and other services such as review of medical records, diagnostic result, patient education, counseling, coordination of care as specified in the encounter.     Aneudy Shaikh MD  Neuro-oncology

## 2025-04-11 ENCOUNTER — APPOINTMENT (OUTPATIENT)
Dept: RADIOLOGY | Facility: HOSPITAL | Age: 70
End: 2025-04-11
Payer: MEDICARE

## 2025-04-11 DIAGNOSIS — E78.00 HYPERCHOLESTEROLEMIA: ICD-10-CM

## 2025-04-11 RX ORDER — SIMVASTATIN 10 MG/1
TABLET, FILM COATED ORAL
Qty: 90 TABLET | Refills: 0 | Status: SHIPPED | OUTPATIENT
Start: 2025-04-11

## 2025-04-14 ENCOUNTER — HOSPITAL ENCOUNTER (OUTPATIENT)
Dept: RADIOLOGY | Facility: HOSPITAL | Age: 70
Discharge: HOME | End: 2025-04-14
Payer: MEDICARE

## 2025-04-14 DIAGNOSIS — D32.9 MENINGIOMA (MULTI): ICD-10-CM

## 2025-04-14 PROCEDURE — 2550000001 HC RX 255 CONTRASTS: Performed by: PSYCHIATRY & NEUROLOGY

## 2025-04-14 PROCEDURE — 70553 MRI BRAIN STEM W/O & W/DYE: CPT

## 2025-04-14 PROCEDURE — A9575 INJ GADOTERATE MEGLUMI 0.1ML: HCPCS | Performed by: PSYCHIATRY & NEUROLOGY

## 2025-04-14 RX ORDER — GADOTERATE MEGLUMINE 376.9 MG/ML
18 INJECTION INTRAVENOUS
Status: COMPLETED | OUTPATIENT
Start: 2025-04-14 | End: 2025-04-14

## 2025-04-14 RX ADMIN — GADOTERATE MEGLUMINE 18 ML: 376.9 INJECTION INTRAVENOUS at 12:34

## 2025-04-15 ENCOUNTER — APPOINTMENT (OUTPATIENT)
Dept: PRIMARY CARE | Facility: CLINIC | Age: 70
End: 2025-04-15
Payer: MEDICARE

## 2025-04-15 VITALS
DIASTOLIC BLOOD PRESSURE: 78 MMHG | OXYGEN SATURATION: 98 % | TEMPERATURE: 97.4 F | HEART RATE: 62 BPM | WEIGHT: 189.9 LBS | BODY MASS INDEX: 35.85 KG/M2 | SYSTOLIC BLOOD PRESSURE: 120 MMHG

## 2025-04-15 DIAGNOSIS — D32.9 MENINGIOMA (MULTI): ICD-10-CM

## 2025-04-15 DIAGNOSIS — I10 BENIGN HYPERTENSION: ICD-10-CM

## 2025-04-15 DIAGNOSIS — E55.9 VITAMIN D DEFICIENCY: ICD-10-CM

## 2025-04-15 DIAGNOSIS — R73.09 ABNORMAL BLOOD SUGAR: Primary | ICD-10-CM

## 2025-04-15 DIAGNOSIS — I10 BENIGN ESSENTIAL HYPERTENSION: ICD-10-CM

## 2025-04-15 PROCEDURE — 3074F SYST BP LT 130 MM HG: CPT | Performed by: INTERNAL MEDICINE

## 2025-04-15 PROCEDURE — G2211 COMPLEX E/M VISIT ADD ON: HCPCS | Performed by: INTERNAL MEDICINE

## 2025-04-15 PROCEDURE — 1160F RVW MEDS BY RX/DR IN RCRD: CPT | Performed by: INTERNAL MEDICINE

## 2025-04-15 PROCEDURE — 99214 OFFICE O/P EST MOD 30 MIN: CPT | Performed by: INTERNAL MEDICINE

## 2025-04-15 PROCEDURE — 3078F DIAST BP <80 MM HG: CPT | Performed by: INTERNAL MEDICINE

## 2025-04-15 PROCEDURE — 1036F TOBACCO NON-USER: CPT | Performed by: INTERNAL MEDICINE

## 2025-04-15 PROCEDURE — 1159F MED LIST DOCD IN RCRD: CPT | Performed by: INTERNAL MEDICINE

## 2025-04-15 RX ORDER — OLMESARTAN MEDOXOMIL AND HYDROCHLOROTHIAZIDE 40/25 40; 25 MG/1; MG/1
1 TABLET ORAL DAILY
Qty: 90 TABLET | Refills: 1 | Status: SHIPPED | OUTPATIENT
Start: 2025-04-15

## 2025-04-15 ASSESSMENT — ENCOUNTER SYMPTOMS
DIFFICULTY URINATING: 0
HEADACHES: 0
BLOOD IN STOOL: 0
FATIGUE: 0
HYPERTENSION: 1
ARTHRALGIAS: 0
PALPITATIONS: 0
FEVER: 0
DIZZINESS: 0
BRUISES/BLEEDS EASILY: 0
SORE THROAT: 0
ABDOMINAL PAIN: 0
WHEEZING: 0
COUGH: 0
SINUS PAIN: 0
DIARRHEA: 0
UNEXPECTED WEIGHT CHANGE: 0

## 2025-04-15 NOTE — PROGRESS NOTES
Subjective   Patient ID: Brinda Ruiz is a 69 y.o. female who presents for Hypertension and Results (BW).    - Recent blood work and the plan of care reviewed with patient  -High fasting blood sugar patient because about diet controlled low-carb diet exercise and weight loss repeat hemoglobin A1c in 6 months  -Recent MRI showed no significant change follow-up neurosurgery as recommended, underlying history of meningioma status post gamma knife treatment - Exercise-induced asthma continue with control as needed new prescription provided  - Hypertension controlled continue with current recommended medication continue low-salt diet patient exercising daily  - History of hepatic steatosis counseled about weight loss counseled about BMI repeat liver ultrasound previous hepatitis C screening negative  Patient brother  from hepatic cirrhosis and possible cancer  Counseled about monitoring and weight loss  Exercise calorie counting  -History of hemorrhoids and hemorrhoidectomy doing well no recurrence.  -Irritable bowel syndrome only on diet controlled asymptomatic not taking any medication at this time continue with high-fiber diet  -Major depression now in remission  --Reflux disease controlled patient did not tolerate a smaller dose patient on 40 milligrams daily counseled about magnesium and potassium replacements  -Hypercholesterolemia on low fat diet continue simvastatin 10 mg.  Repeat lipid profile in 6 months  Follow-up 3 months            Hypertension  Pertinent negatives include no chest pain, headaches or palpitations.          Review of Systems   Constitutional:  Negative for fatigue, fever and unexpected weight change.   HENT:  Negative for congestion, ear discharge, ear pain, mouth sores, sinus pain and sore throat.    Eyes:  Negative for visual disturbance.   Respiratory:  Negative for cough and wheezing.    Cardiovascular:  Negative for chest pain, palpitations and leg swelling.   Gastrointestinal:   Negative for abdominal pain, blood in stool and diarrhea.   Genitourinary:  Negative for difficulty urinating.   Musculoskeletal:  Negative for arthralgias.   Skin:  Negative for rash.   Neurological:  Negative for dizziness and headaches.   Hematological:  Does not bruise/bleed easily.   Psychiatric/Behavioral:  Negative for behavioral problems.    All other systems reviewed and are negative.      Objective   Lab Results   Component Value Date    HGBA1C 5.0 01/06/2024      /78   Pulse 62   Temp 36.3 °C (97.4 °F)   Wt 86.1 kg (189 lb 14.4 oz)   SpO2 98%   BMI 35.85 kg/m²   Lab Results   Component Value Date    WBC 3.3 (L) 01/21/2025    HGB 13.8 01/21/2025    HCT 40.8 01/21/2025     01/21/2025    CHOL 143 01/21/2025    TRIG 103 01/21/2025    HDL 43.1 01/21/2025    ALT 39 01/21/2025    AST 21 01/21/2025     01/21/2025    K 3.9 01/21/2025     01/21/2025    CREATININE 0.71 01/21/2025    BUN 11 01/21/2025    CO2 31 01/21/2025    TSH 1.15 01/21/2025    HGBA1C 5.0 01/06/2024     par   Physical Exam  Vitals and nursing note reviewed.   Constitutional:       Appearance: Normal appearance.   HENT:      Head: Normocephalic.      Nose: Nose normal.   Eyes:      Conjunctiva/sclera: Conjunctivae normal.      Pupils: Pupils are equal, round, and reactive to light.   Cardiovascular:      Rate and Rhythm: Regular rhythm.   Pulmonary:      Effort: Pulmonary effort is normal.      Breath sounds: Normal breath sounds.   Abdominal:      General: Abdomen is flat.      Palpations: Abdomen is soft.   Musculoskeletal:      Cervical back: Neck supple.   Skin:     General: Skin is warm.   Neurological:      General: No focal deficit present.      Mental Status: She is oriented to person, place, and time.   Psychiatric:         Mood and Affect: Mood normal.         Assessment/Plan   Brinda was seen today for hypertension and results.  Diagnoses and all orders for this visit:  Abnormal blood sugar (Primary)  -      Hemoglobin A1C; Future  -     Hemoglobin A1C  Benign hypertension  -     olmesartan-hydrochlorothiazide (BENIcar HCT) 40-25 mg tablet; Take 1 tablet by mouth once daily.  Meningioma (Multi)  Benign essential hypertension  Vitamin D deficiency  Other orders  -     Follow Up In Primary Care - Established  -     Follow Up In Primary Care - Established; Future   - Recent blood work and the plan of care reviewed with patient  -High fasting blood sugar patient because about diet controlled low-carb diet exercise and weight loss repeat hemoglobin A1c in 6 months  -Recent MRI showed no significant change follow-up neurosurgery as recommended, underlying history of meningioma status post gamma knife treatment - Exercise-induced asthma continue with control as needed new prescription provided  - Hypertension controlled continue with current recommended medication continue low-salt diet patient exercising daily  - History of hepatic steatosis counseled about weight loss counseled about BMI repeat liver ultrasound previous hepatitis C screening negative  Patient brother  from hepatic cirrhosis and possible cancer  Counseled about monitoring and weight loss  Exercise calorie counting  -History of hemorrhoids and hemorrhoidectomy doing well no recurrence.  -Irritable bowel syndrome only on diet controlled asymptomatic not taking any medication at this time continue with high-fiber diet  -Major depression now in remission  --Reflux disease controlled patient did not tolerate a smaller dose patient on 40 milligrams daily counseled about magnesium and potassium replacements  -Hypercholesterolemia on low fat diet continue simvastatin 10 mg.  Repeat lipid profile in 6 months  Follow-up 3 months

## 2025-04-18 ENCOUNTER — OFFICE VISIT (OUTPATIENT)
Dept: HEMATOLOGY/ONCOLOGY | Facility: CLINIC | Age: 70
End: 2025-04-18
Payer: MEDICARE

## 2025-04-18 VITALS
RESPIRATION RATE: 18 BRPM | BODY MASS INDEX: 36.51 KG/M2 | SYSTOLIC BLOOD PRESSURE: 121 MMHG | OXYGEN SATURATION: 96 % | HEART RATE: 73 BPM | DIASTOLIC BLOOD PRESSURE: 75 MMHG | WEIGHT: 193.4 LBS | TEMPERATURE: 98.8 F

## 2025-04-18 DIAGNOSIS — I10 BENIGN HYPERTENSION: ICD-10-CM

## 2025-04-18 DIAGNOSIS — D32.9 MENINGIOMA (MULTI): Primary | ICD-10-CM

## 2025-04-18 PROCEDURE — 3078F DIAST BP <80 MM HG: CPT | Performed by: PSYCHIATRY & NEUROLOGY

## 2025-04-18 PROCEDURE — 99213 OFFICE O/P EST LOW 20 MIN: CPT | Performed by: PSYCHIATRY & NEUROLOGY

## 2025-04-18 PROCEDURE — 1126F AMNT PAIN NOTED NONE PRSNT: CPT | Performed by: PSYCHIATRY & NEUROLOGY

## 2025-04-18 PROCEDURE — 3074F SYST BP LT 130 MM HG: CPT | Performed by: PSYCHIATRY & NEUROLOGY

## 2025-04-18 PROCEDURE — 1159F MED LIST DOCD IN RCRD: CPT | Performed by: PSYCHIATRY & NEUROLOGY

## 2025-04-18 ASSESSMENT — PAIN SCALES - GENERAL: PAINLEVEL_OUTOF10: 0-NO PAIN

## 2025-04-21 RX ORDER — HYDROGEN PEROXIDE 3 %
SOLUTION, NON-ORAL MISCELLANEOUS
Qty: 90 CAPSULE | Refills: 1 | Status: SHIPPED | OUTPATIENT
Start: 2025-04-21

## 2025-05-05 NOTE — PROGRESS NOTES
[facility]  Neuro-Oncology Clinic        Patient ID: Brinda Ruiz  Referring Physician: Aneudy Shaikh MD  [address] [place] [address]  Primary Care Provider: Amy Benton MD      Subjective    HPI  Brinda Ruiz is a 69 y.o. female with a history of hypertension hyperlipidemia.  She also has a history of left middle cranial fossa meningioma for which she received gamma knife radiosurgery of 15 Gy on 4/5/2017 (per EMR).    Interval History   2/7/25 (initial clinic visit): Currently denies any headaches or visual changes.  Denies any new cognitive deficits.  Denies any recent falls.    4/18/25 (clinic visit): No new neurological changes since last visit. Denies headaches or visual changes. Continues to be active. No focal weakness or recent falls.     Review of Systems - Oncology   10 point ROS negative except for that mentioned in the HPI    Past Medical History:   Diagnosis Date    Abdominal distension (gaseous) 06/17/2014    Abdominal bloating    Cyst of kidney, acquired 08/18/2014    Renal cyst    Depression, unspecified 10/22/2020    Depression    Diffuse otitis externa, bilateral 02/09/2016    Acute diffuse otitis externa of both ears    Essential (primary) hypertension 12/13/2022    Benign essential hypertension    Hyperlipidemia, unspecified 12/13/2022    Hyperlipidemia    Impacted cerumen, bilateral 09/16/2019    Bilateral impacted cerumen    Impacted cerumen, bilateral 02/09/2016    Impacted cerumen of both ears    Personal history of other diseases of urinary system 08/18/2014    History of hematuria    Personal history of other infectious and parasitic diseases 08/18/2014    History of viral warts    Personal history of urinary (tract) infections 08/26/2014    History of urinary tract infection    Plantar wart 08/18/2014    Plantar wart    Unspecified asthma, uncomplicated ([facility]) 10/22/2020    Asthma     Past Surgical History:    Procedure Laterality Date    COLONOSCOPY  10/31/2013    Complete Colonoscopy    COLONOSCOPY  01/30/2014    Complete Colonoscopy    TUBAL LIGATION  10/31/2013    Tubal Ligation       No family history on file.    Brinda Ruiz  reports that she has never smoked. She has never used smokeless tobacco.  She  reports that she does not currently use alcohol after a past usage of about 1.0 standard drink of alcohol per week.  She  reports no history of drug use.    PHYSICAL EXAM  Objective   BSA: 1.94 meters squared  /75   Pulse 73   Temp 37.1 °C (98.8 °F)   Resp 18   Wt 87.7 kg (193 lb 6.4 oz)   SpO2 96%   BMI 36.51 kg/m²   Karnofsky Score: 100 - Fully active, able to carry on all pre-disease performed without restriction    Gen: Awake and alert.  In no acute distress  CVS/Pulm: No dyspnea noted  Abdomen: Soft.  Nontender.  Nondistended    Neuro  Mental Status: Awake and alert.  Oriented to self and location.  Oriented to year.  Able to recall some of her medical history.  CN: Pupils equally round and reactive to light.  Extraocular muscles intact.  Visual fields full to finger counting.  No facial asymmetry.  Midline palate elevation and tongue protrusion.  Motor: 5/5 in all extremities  Sensation: Intact to light touch throughout  Gait: Normal gait.    LABS  No new lab studies.       RADIATION INFORMATION             IMAGING    MRI Brain 4/14/25 (radiology read)  No acute intracranial process. No abnormal intracranial enhancement.      As compared with previous examination the previously seen enhancing  subcentimeter meningioma along the left middle cranial fossa  demonstrates no residual enhancement. No associated intracranial mass  effect.      Mild chronic small vessel ischemic change and generalized brain  atrophy.      Right mastoid effusion.    === 10/10/18 ===    MRI BRAIN W WO CONTRAST    - Impression -  Stable to slight decrease in size of a 6 mm meningioma in the left  middle cranial fossa.  There is no significant surrounding mass  effect. There is no edema in the brain parenchyma.    This study was interpreted at [facility].    MRI Brain 10/9/17 (radiology read)  Stable left middle cranial fossa meningioma compared to prior  examination dated 04/05/2017.    ASSESSMENT  Brinda Ruiz is a 69 y.o. female with a history of hypertension hyperlipidemia.  She also has a history of left middle cranial fossa meningioma for which she received gamma knife radiosurgery of 15 Gy on 4/5/2017 (per EMR).    PLAN    #Left middle cranial fossa meningioma  - On personal review of MRI brain from 4/14/2025 and on comparison to one from October 2018, there is no evidence of new growth of left middle cranial fossa meningioma.   -Discussed that I can never completely rule out that it will not grow in the future but that we can take conservative approach with imaging.  - Would recommend repeat MRI brain with and without contrast in 2 years.  - Patient is amenable to the plan and she will reach out in about 1 year to schedule the MRI (since were unable to schedule 2 years in advance)    #Follow-up  - Return to clinic after MRI brain in 2 years   - Call or return sooner should new or worsening neurological deficits occur    I personally spent 20 minutes today, exclusive of procedures, providing care for this patient, including preparation, face to face time, documentation and other services such as review of medical records, diagnostic result, patient education, counseling, coordination of care as specified in the encounter.     Aneudy Shaikh MD  Neuro-oncology

## 2025-05-11 ENCOUNTER — ANCILLARY PROCEDURE (OUTPATIENT)
Dept: URGENT CARE | Age: 70
End: 2025-05-11
Payer: MEDICARE

## 2025-05-11 ENCOUNTER — CLINICAL SUPPORT (OUTPATIENT)
Dept: URGENT CARE | Age: 70
End: 2025-05-11
Payer: MEDICARE

## 2025-05-11 VITALS
WEIGHT: 193 LBS | SYSTOLIC BLOOD PRESSURE: 138 MMHG | DIASTOLIC BLOOD PRESSURE: 81 MMHG | HEART RATE: 79 BPM | BODY MASS INDEX: 36.44 KG/M2 | HEIGHT: 61 IN | RESPIRATION RATE: 16 BRPM | OXYGEN SATURATION: 97 % | TEMPERATURE: 98 F

## 2025-05-11 DIAGNOSIS — R07.89 CHEST HEAVINESS: ICD-10-CM

## 2025-05-11 DIAGNOSIS — R05.9 COUGH, UNSPECIFIED TYPE: ICD-10-CM

## 2025-05-11 DIAGNOSIS — J40 BRONCHITIS: Primary | ICD-10-CM

## 2025-05-11 PROCEDURE — 71046 X-RAY EXAM CHEST 2 VIEWS: CPT

## 2025-05-11 RX ORDER — BENZONATATE 200 MG/1
200 CAPSULE ORAL 3 TIMES DAILY PRN
Qty: 21 CAPSULE | Refills: 0 | Status: SHIPPED | OUTPATIENT
Start: 2025-05-11 | End: 2025-05-18

## 2025-05-11 RX ORDER — BENZONATATE 100 MG/1
100 CAPSULE ORAL 3 TIMES DAILY PRN
Qty: 21 CAPSULE | Refills: 0 | Status: SHIPPED | OUTPATIENT
Start: 2025-05-11 | End: 2025-05-11

## 2025-05-11 ASSESSMENT — ENCOUNTER SYMPTOMS
FEVER: 0
SINUS PRESSURE: 0
EYE DISCHARGE: 0
EYE ITCHING: 0
CHILLS: 0
STRIDOR: 0
VOMITING: 0
FATIGUE: 0
DIARRHEA: 0
EYE PAIN: 0
SHORTNESS OF BREATH: 1
COUGH: 1
SORE THROAT: 0
WHEEZING: 0
ABDOMINAL PAIN: 0
DIZZINESS: 0
CHEST TIGHTNESS: 0

## 2025-05-11 NOTE — PATIENT INSTRUCTIONS
Discharge instructions:    Please follow up with your Primary Care Physician within the next 5 days.    My attending physician was consulted on your case.  EKG appears within normal limits.  Your chest x-ray shows no signs of pneumonia.  With the cough that is productive of phlegm it is likely bronchitis related.  Attending physician recommending symptomatic control.  We will send you a cough suppressant to use as needed.  You must follow-up with your primary care physician as above.    If at any point you develop any severe chest pain, shortness of breath on exertion, difficulty breathing to go to the emergency room.  Otherwise in the clinic your vital signs are all within normal limits.    It is important to take prescriptions as prescribed and complete all antibiotics.     If your symptoms worsen you are instructed to immediately go to the emergency room for reevaluation and further assessment.    If you develop any chest pain, SOB, or difficulty breathing you are instructed to go to the emergency room for reevaluation.    All discharge instructions will be provided and explained to the patient at discharge.    If you have any questions regarding your treatment plan please call the [facility] urgent care clinic.

## 2025-05-11 NOTE — PROGRESS NOTES
"Subjective   Patient ID: Brinda Ruiz is a 69 y.o. female. They present today with a chief complaint of Cough (X3 weeks  chest congestion).    History of Present Illness  Patient is a 69-year-old female. Patient states she has had cough and \"chest heaviness\" for the last 3 weeks. Patient denies any runny nose or postnasal drip. Patient states cough is sometimes productive of phlegm and sometimes dry. Patient states she feels more fatigued on exertion. Patient states maybe some mild shortness of breath on exertion. No shortness of breath at rest. Patient denies any chest pain she states it is chest heaviness.  Patient states it feels like there is phlegm in her chest.  Patient states she has some relief intermittently with Mucinex.          Past Medical History  Allergies as of 05/11/2025 - Reviewed 05/11/2025   Allergen Reaction Noted    Amoxicillin Hives 02/14/2024    Penicillins Rash 06/07/2023       Prescriptions Prior to Admission[1]     Medical History[2]    Surgical History[3]     reports that she has never smoked. She has never used smokeless tobacco. She reports that she does not currently use alcohol after a past usage of about 1.0 standard drink of alcohol per week. She reports that she does not use drugs.    Review of Systems  Review of Systems   Constitutional:  Negative for chills, fatigue and fever.   HENT:  Negative for congestion, ear discharge, ear pain, postnasal drip, sinus pressure and sore throat.    Eyes:  Negative for pain, discharge and itching.   Respiratory:  Positive for cough and shortness of breath. Negative for chest tightness, wheezing and stridor.    Cardiovascular:  Negative for chest pain.   Gastrointestinal:  Negative for abdominal pain, diarrhea and vomiting.   Neurological:  Negative for dizziness.                                  Objective    Vitals:    05/11/25 1732   BP: 138/81   BP Location: Left arm   Patient Position: Sitting   BP Cuff Size: Adult   Pulse: 79   Resp: " "16   Temp: 36.7 °C (98 °F)   TempSrc: Oral   SpO2: 97%   Weight: 87.5 kg (193 lb)   Height: 1.549 m (5' 1\")     No LMP recorded. Patient is postmenopausal.    Physical Exam  Vitals reviewed.   Constitutional:       General: She is awake. She is not in acute distress.     Appearance: Normal appearance. She is well-developed. She is not ill-appearing or toxic-appearing.   HENT:      Head: Normocephalic and atraumatic.      Right Ear: Tympanic membrane, ear canal and external ear normal.      Left Ear: Tympanic membrane, ear canal and external ear normal.      Nose: No congestion.      Mouth/Throat:      Mouth: Mucous membranes are moist.      Pharynx: Oropharynx is clear. Uvula midline. No oropharyngeal exudate or posterior oropharyngeal erythema.      Tonsils: No tonsillar exudate or tonsillar abscesses.   Eyes:      Conjunctiva/sclera: Conjunctivae normal.   Cardiovascular:      Rate and Rhythm: Normal rate and regular rhythm.      Heart sounds: Normal heart sounds, S1 normal and S2 normal. No murmur heard.     No friction rub. No gallop.   Pulmonary:      Effort: Pulmonary effort is normal. No respiratory distress.      Breath sounds: Normal breath sounds. No stridor. No wheezing, rhonchi or rales.   Skin:     General: Skin is warm.   Neurological:      General: No focal deficit present.      Mental Status: She is alert and oriented to person, place, and time. Mental status is at baseline.      Gait: Gait is intact.   Psychiatric:         Mood and Affect: Mood normal.         Behavior: Behavior normal. Behavior is cooperative.         Procedures    Point of Care Test & Imaging Results from this visit  No results found for this visit on 05/11/25.   Imaging  No results found.    Cardiology, Vascular, and Other Imaging  No other imaging results found for the past 2 days      Diagnostic study results (if any) were reviewed by [facility].    Assessment/Plan   Allergies, medications, history, and pertinent " "labs/EKGs/Imaging reviewed by Alcides Sutton PA-C.     Medical Decision Making:    Patient is a 69-year-old female. Patient states she has had cough and \"chest heaviness\" for the last 3 weeks. Patient denies any runny nose or postnasal drip. Patient states cough is sometimes productive of phlegm and sometimes dry. Patient states she feels more fatigued on exertion. Patient states maybe some mild shortness of breath on exertion. No shortness of breath at rest. Patient denies any chest pain she states it is chest heaviness.  Patient states it feels like there is phlegm in her chest.  Patient states she has some relief intermittently with Mucinex.  Vital signs in the clinic are stable.  Temp is 98.  Blood pressure within normal limits.  Heart rate 79.  Respiratory rate 16.  Pulse ox 97%.  I did consult the attending physician on the patient case.  Attending physician okay with testing.  EKG in the clinic EKG: VT interval 168 ms.  QRS 84 ms.  QTc 468 ms.  Heart rate 74 bpm.  Appears to be normal axis.  Sinus rhythm.  Normal ECG.  Unconfirmed report.  Attending physician reviewed EKG and agrees.  Chest x-ray in the clinic findings by radiology: Cardiomediastinal silhouette and pulmonary vasculature are within normal limits.  No consolidation, effusion or pneumothorax.  No signs of pitting edema in the lower extremities.  Attending physician believes likely viral illness.  Recommending symptomatic control.  Discharge instructions to follow. Discharge instructions: Please follow up with your Primary Care Physician within the next 5 days. My attending physician was consulted on your case.  EKG appears within normal limits.  Your chest x-ray shows no signs of pneumonia.  With the cough that is productive of phlegm it is likely bronchitis related.  Attending physician recommending symptomatic control.  We will send you a cough suppressant to use as needed.  You must follow-up with your primary care physician as above. If at " any point you develop any severe chest pain, shortness of breath on exertion, difficulty breathing to go to the emergency room.  Otherwise in the clinic your vital signs are all within normal limits. It is important to take prescriptions as prescribed and complete all antibiotics. If your symptoms worsen you are instructed to immediately go to the emergency room for reevaluation and further assessment. If you develop any chest pain, SOB, or difficulty breathing you are instructed to go to the emergency room for reevaluation. All discharge instructions will be provided and explained to the patient at discharge. If you have any questions regarding your treatment plan please call the [facility] urgent care clinic.     Orders and Diagnoses  Diagnoses and all orders for this visit:  Cough, unspecified type  -     XR chest 2 views; Future      Medical Admin Record      Patient disposition: { Disposition:18412}    Electronically signed by [facility]  5:39 PM           [1] (Not in a hospital admission)  [2]   Past Medical History:  Diagnosis Date    Abdominal distension (gaseous) 06/17/2014    Abdominal bloating    Cyst of kidney, acquired 08/18/2014    Renal cyst    Depression, unspecified 10/22/2020    Depression    Diffuse otitis externa, bilateral 02/09/2016    Acute diffuse otitis externa of both ears    Essential (primary) hypertension 12/13/2022    Benign essential hypertension    Hyperlipidemia, unspecified 12/13/2022    Hyperlipidemia    Impacted cerumen, bilateral 09/16/2019    Bilateral impacted cerumen    Impacted cerumen, bilateral 02/09/2016    Impacted cerumen of both ears    Personal history of other diseases of urinary system 08/18/2014    History of hematuria    Personal history of other infectious and parasitic diseases 08/18/2014    History of viral warts    Personal history of urinary (tract) infections 08/26/2014    History of urinary tract infection    Plantar wart 08/18/2014     Plantar wart    Unspecified asthma, uncomplicated ([facility]) 10/22/2020    Asthma   [3]   Past Surgical History:  Procedure Laterality Date    COLONOSCOPY  10/31/2013    Complete Colonoscopy    COLONOSCOPY  01/30/2014    Complete Colonoscopy    TUBAL LIGATION  10/31/2013    Tubal Ligation

## 2025-05-12 ENCOUNTER — TELEPHONE (OUTPATIENT)
Dept: URGENT CARE | Age: 70
End: 2025-05-12

## 2025-07-03 DIAGNOSIS — E78.00 HYPERCHOLESTEROLEMIA: ICD-10-CM

## 2025-07-03 RX ORDER — SIMVASTATIN 10 MG/1
TABLET, FILM COATED ORAL
Qty: 90 TABLET | Refills: 0 | Status: SHIPPED | OUTPATIENT
Start: 2025-07-03

## 2025-08-02 DIAGNOSIS — I10 BENIGN ESSENTIAL HYPERTENSION: ICD-10-CM

## 2025-08-02 DIAGNOSIS — E87.6 HYPOKALEMIA: ICD-10-CM

## 2025-08-04 RX ORDER — VERAPAMIL HCL 240 MG
240 TABLET, EXTENDED RELEASE ORAL DAILY
Qty: 90 TABLET | Refills: 0 | Status: SHIPPED | OUTPATIENT
Start: 2025-08-04

## 2025-08-04 RX ORDER — POTASSIUM CHLORIDE 750 MG/1
10 TABLET, EXTENDED RELEASE ORAL DAILY
Qty: 90 TABLET | Refills: 0 | Status: SHIPPED | OUTPATIENT
Start: 2025-08-04

## 2025-09-02 ENCOUNTER — HOSPITAL ENCOUNTER (OUTPATIENT)
Dept: RADIOLOGY | Facility: HOSPITAL | Age: 70
Discharge: HOME | End: 2025-09-02
Payer: MEDICARE

## 2025-09-02 VITALS — WEIGHT: 193 LBS | BODY MASS INDEX: 36.44 KG/M2 | HEIGHT: 61 IN

## 2025-09-02 DIAGNOSIS — Z12.31 SCREENING MAMMOGRAM FOR BREAST CANCER: ICD-10-CM

## 2025-09-02 PROCEDURE — 77063 BREAST TOMOSYNTHESIS BI: CPT

## 2025-09-02 PROCEDURE — 77063 BREAST TOMOSYNTHESIS BI: CPT | Performed by: RADIOLOGY

## 2025-09-02 PROCEDURE — 77067 SCR MAMMO BI INCL CAD: CPT | Performed by: RADIOLOGY

## 2025-10-15 ENCOUNTER — APPOINTMENT (OUTPATIENT)
Dept: PRIMARY CARE | Facility: CLINIC | Age: 70
End: 2025-10-15
Payer: MEDICARE